# Patient Record
Sex: MALE | Race: WHITE | NOT HISPANIC OR LATINO | Employment: FULL TIME | URBAN - METROPOLITAN AREA
[De-identification: names, ages, dates, MRNs, and addresses within clinical notes are randomized per-mention and may not be internally consistent; named-entity substitution may affect disease eponyms.]

---

## 2018-12-07 ENCOUNTER — OFFICE VISIT (OUTPATIENT)
Dept: INTERNAL MEDICINE | Facility: CLINIC | Age: 33
End: 2018-12-07

## 2018-12-07 VITALS
SYSTOLIC BLOOD PRESSURE: 128 MMHG | RESPIRATION RATE: 18 BRPM | BODY MASS INDEX: 26.74 KG/M2 | TEMPERATURE: 97.4 F | WEIGHT: 201.8 LBS | OXYGEN SATURATION: 98 % | HEART RATE: 65 BPM | DIASTOLIC BLOOD PRESSURE: 78 MMHG | HEIGHT: 73 IN

## 2018-12-07 DIAGNOSIS — Z13.220 LIPID SCREENING: ICD-10-CM

## 2018-12-07 DIAGNOSIS — J40 BRONCHITIS: ICD-10-CM

## 2018-12-07 DIAGNOSIS — Z00.00 WELL ADULT EXAM: Primary | ICD-10-CM

## 2018-12-07 DIAGNOSIS — R05.9 COUGH: ICD-10-CM

## 2018-12-07 LAB
ALBUMIN SERPL-MCNC: 4.8 G/DL (ref 3.2–4.8)
ALBUMIN/GLOB SERPL: 2.1 G/DL (ref 1.5–2.5)
ALP SERPL-CCNC: 60 U/L (ref 25–100)
ALT SERPL W P-5'-P-CCNC: 23 U/L (ref 7–40)
ANION GAP SERPL CALCULATED.3IONS-SCNC: 5 MMOL/L (ref 3–11)
ARTICHOKE IGE QN: 149 MG/DL (ref 0–130)
AST SERPL-CCNC: 22 U/L (ref 0–33)
BILIRUB SERPL-MCNC: 0.6 MG/DL (ref 0.3–1.2)
BUN BLD-MCNC: 13 MG/DL (ref 9–23)
BUN/CREAT SERPL: 12.1 (ref 7–25)
CALCIUM SPEC-SCNC: 9.6 MG/DL (ref 8.7–10.4)
CHLORIDE SERPL-SCNC: 107 MMOL/L (ref 99–109)
CHOLEST SERPL-MCNC: 212 MG/DL (ref 0–200)
CO2 SERPL-SCNC: 30 MMOL/L (ref 20–31)
CREAT BLD-MCNC: 1.07 MG/DL (ref 0.6–1.3)
DEPRECATED RDW RBC AUTO: 40.2 FL (ref 37–54)
ERYTHROCYTE [DISTWIDTH] IN BLOOD BY AUTOMATED COUNT: 12.6 % (ref 11.3–14.5)
GFR SERPL CREATININE-BSD FRML MDRD: 80 ML/MIN/1.73
GLOBULIN UR ELPH-MCNC: 2.3 GM/DL
GLUCOSE BLD-MCNC: 91 MG/DL (ref 70–100)
HCT VFR BLD AUTO: 47.9 % (ref 38.9–50.9)
HDLC SERPL-MCNC: 43 MG/DL (ref 40–60)
HGB BLD-MCNC: 15.8 G/DL (ref 13.1–17.5)
MCH RBC QN AUTO: 29 PG (ref 27–31)
MCHC RBC AUTO-ENTMCNC: 33 G/DL (ref 32–36)
MCV RBC AUTO: 87.9 FL (ref 80–99)
PLATELET # BLD AUTO: 223 10*3/MM3 (ref 150–450)
PMV BLD AUTO: 9.6 FL (ref 6–12)
POTASSIUM BLD-SCNC: 4.8 MMOL/L (ref 3.5–5.5)
PROT SERPL-MCNC: 7.1 G/DL (ref 5.7–8.2)
RBC # BLD AUTO: 5.45 10*6/MM3 (ref 4.2–5.76)
SODIUM BLD-SCNC: 142 MMOL/L (ref 132–146)
T4 FREE SERPL-MCNC: 1.47 NG/DL (ref 0.89–1.76)
TRIGL SERPL-MCNC: 96 MG/DL (ref 0–150)
TSH SERPL DL<=0.05 MIU/L-ACNC: 1.64 MIU/ML (ref 0.35–5.35)
WBC NRBC COR # BLD: 4.53 10*3/MM3 (ref 3.5–10.8)

## 2018-12-07 PROCEDURE — 80061 LIPID PANEL: CPT | Performed by: INTERNAL MEDICINE

## 2018-12-07 PROCEDURE — 90686 IIV4 VACC NO PRSV 0.5 ML IM: CPT | Performed by: INTERNAL MEDICINE

## 2018-12-07 PROCEDURE — 84443 ASSAY THYROID STIM HORMONE: CPT | Performed by: INTERNAL MEDICINE

## 2018-12-07 PROCEDURE — 99385 PREV VISIT NEW AGE 18-39: CPT | Performed by: INTERNAL MEDICINE

## 2018-12-07 PROCEDURE — 36415 COLL VENOUS BLD VENIPUNCTURE: CPT | Performed by: INTERNAL MEDICINE

## 2018-12-07 PROCEDURE — 85027 COMPLETE CBC AUTOMATED: CPT | Performed by: INTERNAL MEDICINE

## 2018-12-07 PROCEDURE — 90471 IMMUNIZATION ADMIN: CPT | Performed by: INTERNAL MEDICINE

## 2018-12-07 PROCEDURE — 84439 ASSAY OF FREE THYROXINE: CPT | Performed by: INTERNAL MEDICINE

## 2018-12-07 PROCEDURE — 80053 COMPREHEN METABOLIC PANEL: CPT | Performed by: INTERNAL MEDICINE

## 2018-12-07 RX ORDER — ALBUTEROL SULFATE 90 UG/1
2 AEROSOL, METERED RESPIRATORY (INHALATION) EVERY 6 HOURS PRN
Qty: 18 G | Refills: 3 | Status: SHIPPED | OUTPATIENT
Start: 2018-12-07 | End: 2021-01-15 | Stop reason: SDUPTHER

## 2018-12-07 NOTE — PROGRESS NOTES
Subjective   Scott Araujo is a 33 y.o. male.     History of Present Illness     Physical Exam    1 cough-   Duration 6-8 weeks  Sx Patient says that cough is worse in the am.  Patient denies any dyspnea on exertion, no wheezing, no fever, chills, no shortness breath, no other systemic symptoms.  He also denies any allergic symptoms such as runny nose, sneezing, watery eyes at this time.    Past Medical History    Exercised induced asthma  Allergy shot as child    Diet regular    Exercise: Minimal to none    Social History no cigarette smoking, no IV drug use, + social EtOH          Review of Systems   All other systems reviewed and are negative.      Objective   Physical Exam   Constitutional: He is oriented to person, place, and time. He appears well-developed and well-nourished.   HENT:   Head: Normocephalic.   Right Ear: External ear normal.   Left Ear: External ear normal.   Nose: Nose normal.   Mouth/Throat: Oropharynx is clear and moist.   Eyes: Conjunctivae and EOM are normal. Pupils are equal, round, and reactive to light.   Neck: Normal range of motion. Neck supple.   Cardiovascular: Normal rate, regular rhythm and normal heart sounds.   Pulmonary/Chest: Effort normal and breath sounds normal.   Abdominal: Soft. Bowel sounds are normal. He exhibits no distension and no mass. There is no tenderness. There is no rebound and no guarding. No hernia.   Genitourinary: Penis normal.   Musculoskeletal: Normal range of motion.   Neurological: He is alert and oriented to person, place, and time.   Skin: Skin is warm. Capillary refill takes less than 2 seconds.   Psychiatric: He has a normal mood and affect. His behavior is normal. Judgment and thought content normal.   Nursing note and vitals reviewed.        Assessment/Plan   Scott was seen today for annual exam and cough.    Diagnoses and all orders for this visit:    Well adult exam  -     CBC (No Diff)  -     Comprehensive Metabolic Panel  -      TSH  -     T4, Free    Cough  -     albuterol (VENTOLIN HFA) 108 (90 Base) MCG/ACT inhaler; Inhale 2 puffs Every 6 (Six) Hours As Needed for Wheezing.    Lipid screening  -     Lipid Panel    Bronchitis  Symptoms may be suggestive of a overlay of bronchitis/exercise-induced asthma  -     albuterol (VENTOLIN HFA) 108 (90 Base) MCG/ACT inhaler; Inhale 2 puffs Every 6 (Six) Hours As Needed for Wheezing.    Recommend over-the-counter remedies to help with cough if persistent such as Delsym or Mucinex      Other orders  -     Fluarix/Fluzone/Afluria Quad>6 Months    Anticipatory guidance:  Recommend routine ophthalmology visit.  Monitor all consumption as tolerated.  Dental visit recommended.

## 2021-01-15 ENCOUNTER — OFFICE VISIT (OUTPATIENT)
Dept: INTERNAL MEDICINE | Facility: CLINIC | Age: 36
End: 2021-01-15

## 2021-01-15 VITALS
TEMPERATURE: 96.4 F | RESPIRATION RATE: 20 BRPM | HEART RATE: 58 BPM | OXYGEN SATURATION: 98 % | HEIGHT: 72 IN | DIASTOLIC BLOOD PRESSURE: 80 MMHG | WEIGHT: 219.13 LBS | BODY MASS INDEX: 29.68 KG/M2 | SYSTOLIC BLOOD PRESSURE: 136 MMHG

## 2021-01-15 DIAGNOSIS — R00.2 PALPITATIONS: ICD-10-CM

## 2021-01-15 DIAGNOSIS — J40 BRONCHITIS: ICD-10-CM

## 2021-01-15 DIAGNOSIS — R05.9 COUGH: ICD-10-CM

## 2021-01-15 DIAGNOSIS — Z23 NEED FOR INFLUENZA VACCINATION: Primary | ICD-10-CM

## 2021-01-15 LAB
ALBUMIN SERPL-MCNC: 5 G/DL (ref 3.5–5.2)
ALBUMIN/GLOB SERPL: 1.9 G/DL
ALP SERPL-CCNC: 57 U/L (ref 39–117)
ALT SERPL W P-5'-P-CCNC: 25 U/L (ref 1–41)
ANION GAP SERPL CALCULATED.3IONS-SCNC: 11.2 MMOL/L (ref 5–15)
AST SERPL-CCNC: 19 U/L (ref 1–40)
BILIRUB SERPL-MCNC: 0.7 MG/DL (ref 0–1.2)
BUN SERPL-MCNC: 20 MG/DL (ref 6–20)
BUN/CREAT SERPL: 19.6 (ref 7–25)
CALCIUM SPEC-SCNC: 9.7 MG/DL (ref 8.6–10.5)
CHLORIDE SERPL-SCNC: 102 MMOL/L (ref 98–107)
CHOLEST SERPL-MCNC: 224 MG/DL (ref 0–200)
CO2 SERPL-SCNC: 27.8 MMOL/L (ref 22–29)
CREAT SERPL-MCNC: 1.02 MG/DL (ref 0.76–1.27)
DEPRECATED RDW RBC AUTO: 40.5 FL (ref 37–54)
ERYTHROCYTE [DISTWIDTH] IN BLOOD BY AUTOMATED COUNT: 13.3 % (ref 12.3–15.4)
GFR SERPL CREATININE-BSD FRML MDRD: 84 ML/MIN/1.73
GLOBULIN UR ELPH-MCNC: 2.7 GM/DL
GLUCOSE SERPL-MCNC: 85 MG/DL (ref 65–99)
HCT VFR BLD AUTO: 47.8 % (ref 37.5–51)
HDLC SERPL-MCNC: 35 MG/DL (ref 40–60)
HGB BLD-MCNC: 16.1 G/DL (ref 13–17.7)
LDLC SERPL CALC-MCNC: 141 MG/DL (ref 0–100)
LDLC/HDLC SERPL: 3.9 {RATIO}
MCH RBC QN AUTO: 28.3 PG (ref 26.6–33)
MCHC RBC AUTO-ENTMCNC: 33.7 G/DL (ref 31.5–35.7)
MCV RBC AUTO: 84.2 FL (ref 79–97)
PLATELET # BLD AUTO: 269 10*3/MM3 (ref 140–450)
PMV BLD AUTO: 9.5 FL (ref 6–12)
POTASSIUM SERPL-SCNC: 4.9 MMOL/L (ref 3.5–5.2)
PROT SERPL-MCNC: 7.7 G/DL (ref 6–8.5)
RBC # BLD AUTO: 5.68 10*6/MM3 (ref 4.14–5.8)
SODIUM SERPL-SCNC: 141 MMOL/L (ref 136–145)
T4 FREE SERPL-MCNC: 1.3 NG/DL (ref 0.93–1.7)
TRIGL SERPL-MCNC: 262 MG/DL (ref 0–150)
TSH SERPL DL<=0.05 MIU/L-ACNC: 1.03 UIU/ML (ref 0.27–4.2)
VLDLC SERPL-MCNC: 48 MG/DL (ref 5–40)
WBC # BLD AUTO: 4.9 10*3/MM3 (ref 3.4–10.8)

## 2021-01-15 PROCEDURE — 93000 ELECTROCARDIOGRAM COMPLETE: CPT | Performed by: INTERNAL MEDICINE

## 2021-01-15 PROCEDURE — 90686 IIV4 VACC NO PRSV 0.5 ML IM: CPT | Performed by: INTERNAL MEDICINE

## 2021-01-15 PROCEDURE — 84439 ASSAY OF FREE THYROXINE: CPT | Performed by: INTERNAL MEDICINE

## 2021-01-15 PROCEDURE — 85027 COMPLETE CBC AUTOMATED: CPT | Performed by: INTERNAL MEDICINE

## 2021-01-15 PROCEDURE — 90471 IMMUNIZATION ADMIN: CPT | Performed by: INTERNAL MEDICINE

## 2021-01-15 PROCEDURE — 80053 COMPREHEN METABOLIC PANEL: CPT | Performed by: INTERNAL MEDICINE

## 2021-01-15 PROCEDURE — 80061 LIPID PANEL: CPT | Performed by: INTERNAL MEDICINE

## 2021-01-15 PROCEDURE — 99214 OFFICE O/P EST MOD 30 MIN: CPT | Performed by: INTERNAL MEDICINE

## 2021-01-15 PROCEDURE — 84443 ASSAY THYROID STIM HORMONE: CPT | Performed by: INTERNAL MEDICINE

## 2021-01-15 RX ORDER — ALBUTEROL SULFATE 90 UG/1
2 AEROSOL, METERED RESPIRATORY (INHALATION) EVERY 6 HOURS PRN
Qty: 18 G | Refills: 3 | Status: SHIPPED | OUTPATIENT
Start: 2021-01-15

## 2021-01-15 NOTE — PROGRESS NOTES
Subjective   Scott Araujo is a 35 y.o. male.     History of Present Illness     The following portions of the patient's history were reviewed and updated as appropriate: allergies, current medications, past family history, past medical history, past social history, past surgical history and problem list.          Complete Adult Physical    1 asthma-chronic and controlled.  Patient continues on appropriate inhaler therapy denies any coughing, wheezing, congestion, dyspnea on exertion, or any other recent hospitalizations or ER visits for his asthma.    2 palpitation- ( three episodes).  Patient says that this is a new recurrent onset of symptoms.  Says that he will develop these episodes intermittently the palpitations only last a few minutes after resolved.  He exercises on a regular basis and does not have any issues    Diet: Regular        Exercise-occasional minimal to none        Social History: No EtOH consumption, no IV drug use, no marijuana, no illicit drugs.        Preventative Screenings  Up-to-date        Immunizations: Up-to-date          Review of Systems   All other systems reviewed and are negative.      Objective   Physical Exam  Vitals signs and nursing note reviewed.   Constitutional:       Appearance: Normal appearance. He is normal weight.   HENT:      Head: Normocephalic and atraumatic.      Right Ear: Tympanic membrane, ear canal and external ear normal.      Left Ear: Tympanic membrane, ear canal and external ear normal.      Nose: Nose normal.      Mouth/Throat:      Mouth: Mucous membranes are moist.   Cardiovascular:      Pulses: Normal pulses.      Heart sounds: Normal heart sounds.   Pulmonary:      Effort: Pulmonary effort is normal.      Breath sounds: Normal breath sounds.   Abdominal:      General: Bowel sounds are normal.      Palpations: Abdomen is soft.   Musculoskeletal: Normal range of motion.   Skin:     General: Skin is warm.      Capillary Refill: Capillary refill  takes less than 2 seconds.   Neurological:      General: No focal deficit present.      Mental Status: He is alert.           Assessment/Plan   Diagnoses and all orders for this visit:    1. Need for influenza vaccination (Primary)  -     Fluarix/Fluzone/Afluria Quad>6 Months    2. Cough  -     albuterol sulfate HFA (Ventolin HFA) 108 (90 Base) MCG/ACT inhaler; Inhale 2 puffs Every 6 (Six) Hours As Needed for Wheezing.  Dispense: 18 g; Refill: 3    3. Bronchitis  -     albuterol sulfate HFA (Ventolin HFA) 108 (90 Base) MCG/ACT inhaler; Inhale 2 puffs Every 6 (Six) Hours As Needed for Wheezing.  Dispense: 18 g; Refill: 3    4. Palpitations (new issue)  -     ECG 12 Lead  -     Treadmill Stress Test; Future  -     CBC (No Diff)  -     Comprehensive Metabolic Panel  -     T4, Free  -     TSH  -     Lipid Panel          ECG 12 Lead    Date/Time: 1/15/2021 11:56 AM  Performed by: Nilo Braswell MD  Authorized by: Nilo Braswell MD   Comparison: not compared with previous ECG   Rhythm: sinus rhythm  Rate: normal  Conduction: conduction normal  ST Segments: ST segments normal  QRS axis: normal    Clinical impression: normal ECG

## 2021-02-19 ENCOUNTER — HOSPITAL ENCOUNTER (OUTPATIENT)
Dept: CARDIOLOGY | Facility: HOSPITAL | Age: 36
End: 2021-02-19

## 2021-03-09 ENCOUNTER — TELEPHONE (OUTPATIENT)
Dept: INTERNAL MEDICINE | Facility: CLINIC | Age: 36
End: 2021-03-09

## 2021-03-09 NOTE — TELEPHONE ENCOUNTER
Patient's wife is calling stating that patient has been admitted into the hospital.  Patient's wife wanted to let provider know

## 2021-03-12 ENCOUNTER — TELEMEDICINE (OUTPATIENT)
Dept: INTERNAL MEDICINE | Facility: CLINIC | Age: 36
End: 2021-03-12

## 2021-03-12 DIAGNOSIS — R94.6 ABNORMAL THYROID FUNCTION TEST: ICD-10-CM

## 2021-03-12 DIAGNOSIS — I10 ESSENTIAL HYPERTENSION: ICD-10-CM

## 2021-03-12 DIAGNOSIS — R07.9 CHEST PAIN, UNSPECIFIED TYPE: ICD-10-CM

## 2021-03-12 DIAGNOSIS — E78.01 FAMILIAL HYPERCHOLESTEROLEMIA: Primary | ICD-10-CM

## 2021-03-12 PROCEDURE — 99214 OFFICE O/P EST MOD 30 MIN: CPT | Performed by: INTERNAL MEDICINE

## 2021-03-12 RX ORDER — SIMVASTATIN 20 MG
20 TABLET ORAL NIGHTLY
Qty: 90 TABLET | Refills: 3 | Status: SHIPPED | OUTPATIENT
Start: 2021-03-12 | End: 2022-01-07 | Stop reason: SDUPTHER

## 2021-03-12 NOTE — PROGRESS NOTES
"Subjective   Scott Araujo is a 35 y.o. male.     History of Present Illness     The following portions of the patient's history were reviewed and updated as appropriate: allergies, current medications, past family history, past medical history, past social history, past surgical history and problem list.    Patient has consented for [facility] Follow up  1 chest pain/pressure-patient was out of town when he started to \"feel different \"and his blood pressure was also elevated.  Patient went to a outside emergency room in [place] and received a full cardiac work-up.  Patient had blood work, EKG, 2D echo, nuclear stress test and all labs and diagnostics were normal.  Only abnormality was that patient had a slight elevated TSH and normal reported range free T4.    Family history:  + Heavy coronary artery disease  Social history: No EtOH consumption, no IV drug use, no marijuana, illicit drugs.      Review of Systems   All other systems reviewed and are negative.      Objective   Physical Exam  Vitals and nursing note reviewed.   Constitutional:       Appearance: Normal appearance. He is normal weight.   HENT:      Head: Normocephalic and atraumatic.      Right Ear: Tympanic membrane, ear canal and external ear normal.      Left Ear: Tympanic membrane, ear canal and external ear normal.      Nose: Nose normal.      Mouth/Throat:      Mouth: Mucous membranes are moist.   Eyes:      Extraocular Movements: Extraocular movements intact.      Conjunctiva/sclera: Conjunctivae normal.      Pupils: Pupils are equal, round, and reactive to light.   Cardiovascular:      Rate and Rhythm: Normal rate.      Pulses: Normal pulses.      Heart sounds: Normal heart sounds.   Pulmonary:      Effort: Pulmonary effort is normal.      Breath sounds: Normal breath sounds.   Abdominal:      General: Bowel sounds are normal.      Palpations: Abdomen is soft.   Skin:     General: Skin is warm.      Capillary Refill: " Capillary refill takes less than 2 seconds.   Neurological:      General: No focal deficit present.      Mental Status: He is alert.   Psychiatric:         Mood and Affect: Mood normal.         Behavior: Behavior normal.         Thought Content: Thought content normal.         Judgment: Judgment normal.           Assessment/Plan   Diagnoses and all orders for this visit:    Spent 30 minutes via video [facility] face-to-face with patient.    1. Familial hypercholesterolemia (Primary)  -     simvastatin (Zocor) 20 MG tablet; Take 1 tablet by mouth Every Night.  Dispense: 90 tablet; Refill: 3    2. Chest pain, unspecified type  -     Ambulatory Referral to Cardiology    3. Essential hypertension-recommend continue on the amlodipine 10 mg by mouth once a day.  Continue to monitor blood pressure readings very closely and record these for me to review.    4. Abnormal thyroid function test  -     T4, Free; Future  -     TSH; Future

## 2021-03-26 ENCOUNTER — LAB (OUTPATIENT)
Dept: INTERNAL MEDICINE | Facility: CLINIC | Age: 36
End: 2021-03-26

## 2021-03-26 DIAGNOSIS — R94.6 ABNORMAL THYROID FUNCTION TEST: ICD-10-CM

## 2021-03-26 LAB
T4 FREE SERPL-MCNC: 1.35 NG/DL (ref 0.93–1.7)
TSH SERPL DL<=0.05 MIU/L-ACNC: 1.91 UIU/ML (ref 0.27–4.2)

## 2021-03-26 PROCEDURE — 36415 COLL VENOUS BLD VENIPUNCTURE: CPT | Performed by: INTERNAL MEDICINE

## 2021-03-26 PROCEDURE — 84443 ASSAY THYROID STIM HORMONE: CPT | Performed by: INTERNAL MEDICINE

## 2021-03-26 PROCEDURE — 84439 ASSAY OF FREE THYROXINE: CPT | Performed by: INTERNAL MEDICINE

## 2021-04-01 RX ORDER — PANTOPRAZOLE SODIUM 40 MG/1
40 TABLET, DELAYED RELEASE ORAL DAILY
COMMUNITY
Start: 2021-03-09 | End: 2021-04-12 | Stop reason: SDUPTHER

## 2021-04-01 RX ORDER — AMLODIPINE BESYLATE 10 MG/1
10 TABLET ORAL DAILY
COMMUNITY
Start: 2021-03-09 | End: 2021-04-01 | Stop reason: SDUPTHER

## 2021-04-01 RX ORDER — AMLODIPINE BESYLATE 10 MG/1
10 TABLET ORAL DAILY
Qty: 30 TABLET | Refills: 2 | Status: SHIPPED | OUTPATIENT
Start: 2021-04-01 | End: 2021-07-07

## 2021-04-11 DIAGNOSIS — E78.01 FAMILIAL HYPERCHOLESTEROLEMIA: ICD-10-CM

## 2021-04-11 RX ORDER — SIMVASTATIN 20 MG
20 TABLET ORAL NIGHTLY
Qty: 90 TABLET | Refills: 3 | Status: CANCELLED | OUTPATIENT
Start: 2021-04-11

## 2021-04-12 NOTE — TELEPHONE ENCOUNTER
----- Message from Scott Araujo sent at 4/11/2021  8:18 PM EDT -----  Regarding: Prescription Question  Contact: [phone]  I’m out of my pantoprazole 40 mg. Can you please get a refill called in to [facility]? (I couldn’t request through the prescription area for some reason).    I also have only 3 days worth of my statin if you can get that called in too please.     My BP is still too elevated with my current meds. I’ve been keeping a chart. Systolic in the 140’s during the day. Do we need to modify?    Thank you!    Herve Araujo

## 2021-04-12 NOTE — TELEPHONE ENCOUNTER
Patient has refills on his Cholesterol medication, no need for a refill. Please advise on his BP and I have pended the Protonix for approval.

## 2021-04-13 RX ORDER — PANTOPRAZOLE SODIUM 40 MG/1
40 TABLET, DELAYED RELEASE ORAL DAILY
Qty: 90 TABLET | Refills: 2 | Status: SHIPPED | OUTPATIENT
Start: 2021-04-13 | End: 2022-01-07 | Stop reason: SDUPTHER

## 2021-04-16 ENCOUNTER — TELEPHONE (OUTPATIENT)
Dept: INTERNAL MEDICINE | Facility: CLINIC | Age: 36
End: 2021-04-16

## 2021-04-16 ENCOUNTER — TELEMEDICINE (OUTPATIENT)
Dept: INTERNAL MEDICINE | Facility: CLINIC | Age: 36
End: 2021-04-16

## 2021-04-16 DIAGNOSIS — I10 ESSENTIAL HYPERTENSION: Primary | ICD-10-CM

## 2021-04-16 PROCEDURE — 99214 OFFICE O/P EST MOD 30 MIN: CPT | Performed by: INTERNAL MEDICINE

## 2021-04-16 NOTE — TELEPHONE ENCOUNTER
Patient states his B/P was elevated yesterday 170/90's for hours with dizziness. He states it's normal this morning but by midday it's elevated again. He states he has been taking his medication. He states he has an appt with Dr Putnam at the end of May. He states should his medication be altered. He can be reached at [phone].

## 2021-04-16 NOTE — PROGRESS NOTES
Subjective   Scott Araujo is a 36 y.o. male.     History of Present Illness     The following portions of the patient's history were reviewed and updated as appropriate: allergies, current medications, past family history, past medical history, past social history, past surgical history and problem list.    Patient has consented for video MyChart    1 Hypertension-patient says that he had an elevated blood pressure reading recently yesterday of a blood pressure of 170/100 and says that he felt dizzy, lightheaded, and minimal palpitations, no shortness of breath, no chest pain, no nausea, no vomiting no diarrhea, no fever, no chills, other systemic symptoms.    Social history: +24 ounces of caffeine within the 6-hour period prior to this particular incident and normally he only drinks about 12 ounces.  Of caffeine      Review of Systems   All other systems reviewed and are negative.      Objective   Physical Exam  Vitals and nursing note reviewed.   Constitutional:       Appearance: Normal appearance.   HENT:      Head: Normocephalic and atraumatic.      Right Ear: Tympanic membrane normal.      Left Ear: Tympanic membrane normal.      Nose: Nose normal.      Mouth/Throat:      Mouth: Mucous membranes are moist.   Eyes:      Extraocular Movements: Extraocular movements intact.      Pupils: Pupils are equal, round, and reactive to light.   Musculoskeletal:         General: Normal range of motion.   Skin:     General: Skin is warm.      Capillary Refill: Capillary refill takes less than 2 seconds.   Neurological:      General: No focal deficit present.      Mental Status: He is alert.   Psychiatric:         Mood and Affect: Mood normal.         Behavior: Behavior normal.         Thought Content: Thought content normal.         Judgment: Judgment normal.           Assessment/Plan   Diagnoses and all orders for this visit:    1. Essential hypertension (Primary)    Spent 30 minutes face-to-face via video  Thomas visit with patient.    After review of history, physical, strongly recommend emphasis on lifestyle modification as patient does tend to consume moderate amount of caffeine.  Patient had double dose of caffeine hours prior to this elevated blood pressure reading and I think this had contributed to it.    Emphasis on decreasing or eliminating caffeine in diet as well as consider exercise will help maintain appropriate blood pressure.    Patient to record blood pressure readings and report to me in approximately 1 month

## 2021-07-07 RX ORDER — AMLODIPINE BESYLATE 10 MG/1
TABLET ORAL
Qty: 90 TABLET | Refills: 3 | Status: SHIPPED | OUTPATIENT
Start: 2021-07-07 | End: 2021-07-07 | Stop reason: SDUPTHER

## 2021-07-07 RX ORDER — AMLODIPINE BESYLATE 10 MG/1
10 TABLET ORAL DAILY
Qty: 90 TABLET | Refills: 3 | Status: SHIPPED | OUTPATIENT
Start: 2021-07-07 | End: 2022-01-07 | Stop reason: SDUPTHER

## 2022-01-07 DIAGNOSIS — E78.01 FAMILIAL HYPERCHOLESTEROLEMIA: ICD-10-CM

## 2022-01-07 RX ORDER — AMLODIPINE BESYLATE 10 MG/1
10 TABLET ORAL DAILY
Qty: 90 TABLET | Refills: 3 | Status: SHIPPED | OUTPATIENT
Start: 2022-01-07

## 2022-01-07 RX ORDER — PANTOPRAZOLE SODIUM 40 MG/1
40 TABLET, DELAYED RELEASE ORAL DAILY
Qty: 90 TABLET | Refills: 2 | Status: SHIPPED | OUTPATIENT
Start: 2022-01-07 | End: 2022-10-03

## 2022-01-07 RX ORDER — SIMVASTATIN 20 MG
20 TABLET ORAL NIGHTLY
Qty: 90 TABLET | Refills: 3 | Status: SHIPPED | OUTPATIENT
Start: 2022-01-07 | End: 2023-01-03

## 2022-01-11 ENCOUNTER — TELEPHONE (OUTPATIENT)
Dept: INTERNAL MEDICINE | Facility: CLINIC | Age: 37
End: 2022-01-11

## 2022-01-11 NOTE — TELEPHONE ENCOUNTER
Caller: Scott Araujo    Relationship: Self    Best call back number: [phone]    What medication are you requesting: PAXOOVID    What are your current symptoms: POSITIVE FOR COVID; COUGH, BODY ACHES, FATIGUE;  FAMILY MEMBER POSITIVE    How long have you been experiencing symptoms: 3 DAYS    Have you had these symptoms before:    [] Yes  [] No    Have you been treated for these symptoms before:   [] Yes  [] No    If a prescription is needed, what is your preferred pharmacy and phone number:      [facility] [address] [phone] [facility] [phone]         Additional notes:

## 2022-01-12 NOTE — TELEPHONE ENCOUNTER
S/W Jaida and advised her that Paxoovid is not available in [place] at this time.      Also, informed her that the only medication needed is through supportive care i.e. pushing for fluids for hydration, decongestant like Mucinex and antihistamine like Claritin or Zyrtec for upper respiratory symptoms, cough suppressant (Delsym) if needed and to alternate Tylenol and/or Motrin for fever reduction and muscle aches    Patient should continue to monitor respiratory symptoms during quarantine and if symptoms become worse with breathing, coughing, feeling short of breath, fever, chills, dehydrated patient should be seen in the emergency room i.e. if symptoms are getting worse.       Encourage patient to purchase a pulse ox to monitor oxygen levels.  If O2 is 92% or less he should go to the ED.

## 2022-01-12 NOTE — TELEPHONE ENCOUNTER
I checked with the pharmacy and this medication is only available currently in 3 states.  [place] is not one of them.  This medication is fairly new especially under the emergency authorization contacts for COVID-19 and is going to be limited distribution currently.

## 2022-06-10 ENCOUNTER — OFFICE VISIT (OUTPATIENT)
Dept: INTERNAL MEDICINE | Facility: CLINIC | Age: 37
End: 2022-06-10

## 2022-06-10 VITALS
WEIGHT: 218.25 LBS | TEMPERATURE: 98.7 F | OXYGEN SATURATION: 98 % | BODY MASS INDEX: 29.6 KG/M2 | SYSTOLIC BLOOD PRESSURE: 118 MMHG | RESPIRATION RATE: 20 BRPM | DIASTOLIC BLOOD PRESSURE: 80 MMHG | HEART RATE: 66 BPM

## 2022-06-10 DIAGNOSIS — M54.2 NECK PAIN: ICD-10-CM

## 2022-06-10 DIAGNOSIS — Z13.220 LIPID SCREENING: ICD-10-CM

## 2022-06-10 DIAGNOSIS — G89.29 CHRONIC BILATERAL LOW BACK PAIN WITHOUT SCIATICA: Primary | ICD-10-CM

## 2022-06-10 DIAGNOSIS — I10 ESSENTIAL HYPERTENSION: ICD-10-CM

## 2022-06-10 DIAGNOSIS — M54.50 CHRONIC BILATERAL LOW BACK PAIN WITHOUT SCIATICA: Primary | ICD-10-CM

## 2022-06-10 DIAGNOSIS — R10.32 LEFT INGUINAL PAIN: ICD-10-CM

## 2022-06-10 DIAGNOSIS — Z00.00 HEALTHCARE MAINTENANCE: ICD-10-CM

## 2022-06-10 LAB
ALBUMIN SERPL-MCNC: 4.8 G/DL (ref 3.5–5.2)
ALBUMIN/GLOB SERPL: 1.7 G/DL
ALP SERPL-CCNC: 65 U/L (ref 39–117)
ALT SERPL-CCNC: 29 U/L (ref 1–41)
AST SERPL-CCNC: 22 U/L (ref 1–40)
BILIRUB BLD-MCNC: NEGATIVE MG/DL
BILIRUB SERPL-MCNC: 0.4 MG/DL (ref 0–1.2)
BUN SERPL-MCNC: 12 MG/DL (ref 6–20)
BUN/CREAT SERPL: 12.9 (ref 7–25)
CALCIUM SERPL-MCNC: 9.6 MG/DL (ref 8.6–10.5)
CHLORIDE SERPL-SCNC: 103 MMOL/L (ref 98–107)
CHOLEST SERPL-MCNC: 180 MG/DL (ref 0–200)
CLARITY, POC: CLEAR
CO2 SERPL-SCNC: 26.2 MMOL/L (ref 22–29)
COLOR UR: YELLOW
CREAT SERPL-MCNC: 0.93 MG/DL (ref 0.76–1.27)
EGFRCR SERPLBLD CKD-EPI 2021: 109.1 ML/MIN/1.73
ERYTHROCYTE [DISTWIDTH] IN BLOOD BY AUTOMATED COUNT: 13.1 % (ref 12.3–15.4)
EXPIRATION DATE: ABNORMAL
GLOBULIN SER CALC-MCNC: 2.9 GM/DL
GLUCOSE SERPL-MCNC: 83 MG/DL (ref 65–99)
GLUCOSE UR STRIP-MCNC: NEGATIVE MG/DL
HCT VFR BLD AUTO: 44.9 % (ref 37.5–51)
HDLC SERPL-MCNC: 36 MG/DL (ref 40–60)
HGB BLD-MCNC: 15.3 G/DL (ref 13–17.7)
KETONES UR QL: ABNORMAL
LDLC SERPL CALC-MCNC: 89 MG/DL (ref 0–100)
LEUKOCYTE EST, POC: NEGATIVE
Lab: ABNORMAL
MCH RBC QN AUTO: 28.8 PG (ref 26.6–33)
MCHC RBC AUTO-ENTMCNC: 34.1 G/DL (ref 31.5–35.7)
MCV RBC AUTO: 84.4 FL (ref 79–97)
NITRITE UR-MCNC: NEGATIVE MG/ML
PH UR: 7 [PH] (ref 5–8)
PLATELET # BLD AUTO: 269 10*3/MM3 (ref 140–450)
POTASSIUM SERPL-SCNC: 4.3 MMOL/L (ref 3.5–5.2)
PROT SERPL-MCNC: 7.7 G/DL (ref 6–8.5)
PROT UR STRIP-MCNC: NEGATIVE MG/DL
RBC # BLD AUTO: 5.32 10*6/MM3 (ref 4.14–5.8)
RBC # UR STRIP: NEGATIVE /UL
SODIUM SERPL-SCNC: 142 MMOL/L (ref 136–145)
SP GR UR: 1 (ref 1–1.03)
T4 FREE SERPL-MCNC: 1.37 NG/DL (ref 0.93–1.7)
TRIGL SERPL-MCNC: 333 MG/DL (ref 0–150)
TSH SERPL DL<=0.005 MIU/L-ACNC: 1.55 UIU/ML (ref 0.27–4.2)
UROBILINOGEN UR QL: NORMAL
VLDLC SERPL CALC-MCNC: 55 MG/DL (ref 5–40)
WBC # BLD AUTO: 5.41 10*3/MM3 (ref 3.4–10.8)

## 2022-06-10 PROCEDURE — 81003 URINALYSIS AUTO W/O SCOPE: CPT | Performed by: INTERNAL MEDICINE

## 2022-06-10 PROCEDURE — 99214 OFFICE O/P EST MOD 30 MIN: CPT | Performed by: INTERNAL MEDICINE

## 2022-06-10 RX ORDER — LOSARTAN POTASSIUM 100 MG/1
100 TABLET ORAL DAILY
COMMUNITY
Start: 2022-06-07

## 2022-06-10 NOTE — PROGRESS NOTES
"Chief Complaint  Back Pain and Groin Pain    Subjective    Scott Araujo is a 37 y.o. male.     Scott Araujo presents to [facility] for back and inguinal pain.      History of Present Illness  1. 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 He states that he gets massages every 3 weeks and it keeps everything at bay.     2. Inguinal pain - He reports that this pain is different to his chronic pain. He reports that 2 weeks ago, he was golfing and he took a swing and then reached up front and it felt like he \"popped\" his testicle and he had a tugging and pulling sensation, however he continued playing with the pain. He reports that 1 week later, on 05/31/2022 and 06/01/2022, he was unable to sit, bend over and put socks or underwear on. He reports that the pain was radiating and he was having a very difficult time trying to locate the pain. On 06/02/2022 and 06/03/2022 he could not sit and he was asymptomatic on 06/04/2022. 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 He reports that the pain was terrible on 06/05/2022 and 06/06/2022 and he was fine from 06/07/2022 through 06/09/2022. The patient reports that the pain is intermittent, there is no bulge and he is unable to palpate anything. He reports that the discrepancy in pain level is really what is throwing him off. He states that the pain is sharp, piercing and debilitating. He reports that the intermittent asymptomatic days are confusing him and the last couple of days it has been painful but he has been able to be mobile.    The following portions of the patient's history were reviewed and updated as appropriate: allergies, current medications, past family history, past medical history, past social history, past surgical history and problem list.    Review of " Systems   A review of systems was performed, and the pertinent positives are noted in the HPI.      Objective   Vital Signs:   /80 (BP Location: Right arm, Patient Position: Sitting, Cuff Size: Adult)   Pulse 66   Temp 98.7 °F (37.1 °C) (Temporal)   Resp 20   Wt 99 kg (218 lb 4 oz)   SpO2 98%   BMI 29.60 kg/m²     Body mass index is 29.6 kg/m².    Physical Exam   Patient is alert x3, in no distress.  HEENT: Head was normocephalic, atraumatic. Moist membranes.  Chest is clear to auscultation, no rhonchi, wheeze.  Cardiac exam: S1 and S2, no murmurs, rubs, or gallops.  GI: Positive bowel sounds, soft, no mass, no tenderness.   exam: Specifically in the left inguinal area, I did not appreciate any bulging or any pain to palpation in this particular area. No lesions on the penis. Scrotum intact. No swelling upon Valsalva. Did not appreciate any full hernia. Palpation to the lower suprapubic area did not reveal any particular areas of concern. Also palpated on the left costovertebral area in the posterior renal area and there was no tenderness or involvement in that area.     Assessment and Plan  Diagnoses and all orders for this visit:    1. Inguinal pain and discomfort suggestive of a possible mild inguinal hernia versus left inguinal groin strain        -  I did discuss with patient different signs to look out for and continue observation in regards to aggravating factors, relieving factors, location, activity, and also things which he can do to help relieve, to keep observation to allow us to have a better history and assessment of what may be going on.        -  I will also go ahead and refer him to general surgery to evaluate this area to see if there are any other concerns of a pending hernia or other issues.    2. Hypertension        -  Blood pressure looks controlled today. Patient continues on the amlodipine 10 mg by mouth once a day and Cozaar 100 mg by mouth once a day. Otherwise, everything  looks good here.    I will see him back as needed or in 6 months on routine follow-up.    Follow Up   Return Needs to schedule for annual physical.  Patient was given instructions and counseling regarding his condition or for health maintenance advice. Please see specific information pulled into the AVS if appropriate.      Transcribed from ambient dictation for Nilo Braswell MD by Sandy Duong.  06/10/22   10:53 EDT    Patient verbalized consent to the visit recording.  I have personally performed the services described in this document as transcribed by the above individual, and it is both accurate and complete.  Nilo Braswell MD  6/25/2022  17:43 EDT

## 2022-10-03 RX ORDER — PANTOPRAZOLE SODIUM 40 MG/1
TABLET, DELAYED RELEASE ORAL
Qty: 90 TABLET | Refills: 2 | Status: SHIPPED | OUTPATIENT
Start: 2022-10-03

## 2022-11-30 ENCOUNTER — PATIENT MESSAGE (OUTPATIENT)
Dept: INTERNAL MEDICINE | Facility: CLINIC | Age: 37
End: 2022-11-30

## 2022-12-01 DIAGNOSIS — Z91.018 FOOD ALLERGY: Primary | ICD-10-CM

## 2022-12-01 RX ORDER — EPINEPHRINE 0.3 MG/.3ML
0.3 INJECTION SUBCUTANEOUS ONCE
Qty: 1 EACH | Refills: 1 | Status: SHIPPED | OUTPATIENT
Start: 2022-12-01 | End: 2022-12-01

## 2022-12-05 ENCOUNTER — TELEPHONE (OUTPATIENT)
Dept: INTERNAL MEDICINE | Facility: CLINIC | Age: 37
End: 2022-12-05

## 2022-12-05 DIAGNOSIS — Z91.018 FOOD ALLERGY: Primary | ICD-10-CM

## 2022-12-05 NOTE — TELEPHONE ENCOUNTER
Caller: Scott Araujo    Relationship: Self    Best call back number: [phone]    What orders are you requesting (i.e. lab or imaging): LAB ALFEGAL SYNDROME     In what timeframe would the patient need to come in: ASAP    Where will you receive your lab/imaging services: [facility] [place]    Additional notes: PATIENT WILL UPDATE WITH FAX NUMBER TO SEND ORDER, PLEASE REVIEW PATIENT MESSAGES FOR FAX NUMBER

## 2022-12-05 NOTE — TELEPHONE ENCOUNTER
Caller: Scott Araujo    Relationship: Self    Best call back number: [phone]       What orders are you requesting (i.e. lab or imaging): LAB ORDERS FOR AN alpha-gal blood test       In what timeframe would the patient need to come in: ASAP    Where will you receive your lab/imaging services: [facility] (address is [address] [place] [address]).   PHONE NUMBER [phone]  UNSURE OF FAX NUMBER    Additional notes: PLEASE AND ADVISE WHEN LABS ORDERS HAVE BEEN SENT TO [facility]

## 2022-12-08 ENCOUNTER — LAB (OUTPATIENT)
Dept: LAB | Facility: HOSPITAL | Age: 37
End: 2022-12-08

## 2022-12-08 DIAGNOSIS — Z91.018 FOOD ALLERGY: ICD-10-CM

## 2022-12-08 NOTE — TELEPHONE ENCOUNTER
From: Scott Araujo  To: Nilo Braswell MD  Sent: 11/30/2022 12:05 PM EST  Subject: Alpha-gal test and epi pen     Hi Dr. Braswell     Can you send in an order for an alpha-gal blood test for me? I’d like to go do it in between patients while I’m up in [place] at [facility] (address is [facility] [address] [place] [address]). I had several bites all over my body during hunting season that I assumed were chiggers but very may well have been ticks. Since then I have broken out in hives 4 times. The common denominator has been I have eaten meat the night before at dinner. All times the hives have come the following morning. It has not obstructed the airway but has led to difficulty swallowing and hives all over. I have taken benadryl to control but they last about 24 hours. Also I was wondering if you can call in a prescription for an epi pen in case I need it?      Thank you    Herve Araujo

## 2022-12-13 LAB
ALPHA-GAL IGE QN: 74.2 KU/L
BEEF IGE QN: 9.73 KU/L
CONV CLASS DESCRIPTION: ABNORMAL
IGE SERPL-ACNC: 799 IU/ML (ref 6–495)
LAMB IGE QN: 2.3 KU/L
PORK IGE QN: 2.53 KU/L

## 2023-01-02 DIAGNOSIS — E78.01 FAMILIAL HYPERCHOLESTEROLEMIA: ICD-10-CM

## 2023-01-03 RX ORDER — SIMVASTATIN 20 MG
TABLET ORAL
Qty: 90 TABLET | Refills: 3 | Status: SHIPPED | OUTPATIENT
Start: 2023-01-03

## 2023-04-10 RX ORDER — AMLODIPINE BESYLATE 10 MG/1
TABLET ORAL
Qty: 90 TABLET | Refills: 3 | Status: SHIPPED | OUTPATIENT
Start: 2023-04-10

## 2024-01-21 RX ORDER — OSELTAMIVIR PHOSPHATE 75 MG/1
75 CAPSULE ORAL DAILY
Qty: 10 CAPSULE | Refills: 0 | Status: SHIPPED | OUTPATIENT
Start: 2024-01-21

## 2024-01-25 DIAGNOSIS — Z20.828 EXPOSURE TO INFLUENZA: Primary | ICD-10-CM

## 2024-04-05 DIAGNOSIS — I10 PRIMARY HYPERTENSION: Primary | ICD-10-CM

## 2024-04-05 NOTE — TELEPHONE ENCOUNTER
LOV 06/10/2022  NOV     Tried to reach patient no answer left voicemail to return call    RELAY:  We recently received your message to refill your medication(s).  Our records indicate that you did not schedule a follow up appointment with your provider at your last visit on 06/10/2022. The medication that you are on requires a follow up appointment for additional refills. Patient was to schedule on or around 04/1/2024 for Annual Physical    If he is unable to keep his appointment we will not be able to provider further refills.  Once appointment has been scheduled please update message with date and time so we can process the request.  We will forward the message to the provider to review the refill request.

## 2024-04-08 NOTE — TELEPHONE ENCOUNTER
2nd attempt     LOV 06/10/2022  NOV      Tried to reach patient no answer left voicemail to return call     RELAY:  We recently received your message to refill your medication(s).  Our records indicate that you did not schedule a follow up appointment with your provider at your last visit on 06/10/2022. The medication that you are on requires a follow up appointment for additional refills. Patient was to schedule on or around 04/1/2024 for Annual Physical     If he is unable to keep his appointment we will not be able to provider further refills.  Once appointment has been scheduled please update message with date and time so we can process the request.  We will forward the message to the provider to review the refill request.

## 2024-04-10 RX ORDER — AMLODIPINE BESYLATE 10 MG/1
TABLET ORAL
Qty: 90 TABLET | Refills: 0 | Status: SHIPPED | OUTPATIENT
Start: 2024-04-10 | End: 2024-04-11

## 2024-04-10 NOTE — TELEPHONE ENCOUNTER
Please tell patient that his blood pressure medication has been called in but he will need to make a follow-up appointment for further refills in the future

## 2024-04-10 NOTE — TELEPHONE ENCOUNTER
3rd attempt      LOV 06/10/2022  NOV      Tried to reach patient no answer left voicemail to return call     RELAY:  We recently received your message to refill your medication(s).  Our records indicate that you did not schedule a follow up appointment with your provider at your last visit on 06/10/2022. The medication that you are on requires a follow up appointment for additional refills. Patient was to schedule on or around 04/1/2024 for Annual Physical     If he is unable to keep his appointment we will not be able to provider further refills.  Once appointment has been scheduled please update message with date and time so we can process the request.  We will forward the message to the provider to review the refill request.

## 2024-04-11 DIAGNOSIS — I10 PRIMARY HYPERTENSION: ICD-10-CM

## 2024-04-11 RX ORDER — AMLODIPINE BESYLATE 10 MG/1
10 TABLET ORAL DAILY
Qty: 90 TABLET | Refills: 0 | Status: SHIPPED | OUTPATIENT
Start: 2024-04-11

## 2024-07-07 DIAGNOSIS — I10 PRIMARY HYPERTENSION: ICD-10-CM

## 2024-07-08 RX ORDER — AMLODIPINE BESYLATE 10 MG/1
10 TABLET ORAL DAILY
Qty: 30 TABLET | Refills: 0 | Status: SHIPPED | OUTPATIENT
Start: 2024-07-08

## 2024-08-07 DIAGNOSIS — I10 PRIMARY HYPERTENSION: ICD-10-CM

## 2024-08-08 RX ORDER — AMLODIPINE BESYLATE 10 MG/1
10 TABLET ORAL DAILY
Qty: 30 TABLET | Refills: 1 | Status: SHIPPED | OUTPATIENT
Start: 2024-08-08

## 2024-08-08 NOTE — TELEPHONE ENCOUNTER
Tried to reach patient no answer left voicemail to return call    RELAY:  Courtesy refill for blood pressure medication has been called in but patient will need to make a follow-up and labs to be done for further refills

## 2024-08-08 NOTE — TELEPHONE ENCOUNTER
Courtesy refill for blood pressure medication has been called in but patient will need to make a follow-up and labs to be done for further refills

## 2024-08-09 NOTE — TELEPHONE ENCOUNTER
2nd attempt     Tried to reach patient no answer left voicemail to return call     RELAY:  [facility] refill for blood pressure medication has been called in but patient will need to make a follow-up and labs to be done for further refills

## 2024-08-12 NOTE — TELEPHONE ENCOUNTER
3rd attempt      Tried to reach patient no answer left voicemail to return call     RELAY:  Courtesy refill for blood pressure medication has been called in but patient will need to make a follow-up and labs to be done for further refills

## 2024-12-13 ENCOUNTER — PRE-ADMISSION TESTING (OUTPATIENT)
Dept: PREADMISSION TESTING | Facility: HOSPITAL | Age: 39
End: 2024-12-13
Payer: COMMERCIAL

## 2024-12-13 VITALS — WEIGHT: 227.51 LBS | HEIGHT: 72 IN | BODY MASS INDEX: 30.82 KG/M2

## 2024-12-13 LAB
ANION GAP SERPL CALCULATED.3IONS-SCNC: 10 MMOL/L (ref 5–15)
BUN SERPL-MCNC: 16 MG/DL (ref 6–20)
BUN/CREAT SERPL: 15.5 (ref 7–25)
CALCIUM SPEC-SCNC: 9.6 MG/DL (ref 8.6–10.5)
CHLORIDE SERPL-SCNC: 100 MMOL/L (ref 98–107)
CO2 SERPL-SCNC: 27 MMOL/L (ref 22–29)
CREAT SERPL-MCNC: 1.03 MG/DL (ref 0.76–1.27)
DEPRECATED RDW RBC AUTO: 39.3 FL (ref 37–54)
EGFRCR SERPLBLD CKD-EPI 2021: 95.4 ML/MIN/1.73
ERYTHROCYTE [DISTWIDTH] IN BLOOD BY AUTOMATED COUNT: 12.2 % (ref 12.3–15.4)
GLUCOSE SERPL-MCNC: 116 MG/DL (ref 65–99)
HCT VFR BLD AUTO: 47.7 % (ref 37.5–51)
HGB BLD-MCNC: 16.9 G/DL (ref 13–17.7)
MCH RBC QN AUTO: 30.9 PG (ref 26.6–33)
MCHC RBC AUTO-ENTMCNC: 35.4 G/DL (ref 31.5–35.7)
MCV RBC AUTO: 87.2 FL (ref 79–97)
PLATELET # BLD AUTO: 236 10*3/MM3 (ref 140–450)
PMV BLD AUTO: 8.9 FL (ref 6–12)
POTASSIUM SERPL-SCNC: 4.7 MMOL/L (ref 3.5–5.2)
QT INTERVAL: 376 MS
QTC INTERVAL: 381 MS
RBC # BLD AUTO: 5.47 10*6/MM3 (ref 4.14–5.8)
SODIUM SERPL-SCNC: 137 MMOL/L (ref 136–145)
WBC NRBC COR # BLD AUTO: 6 10*3/MM3 (ref 3.4–10.8)

## 2024-12-13 PROCEDURE — 93005 ELECTROCARDIOGRAM TRACING: CPT

## 2024-12-13 PROCEDURE — 85027 COMPLETE CBC AUTOMATED: CPT

## 2024-12-13 PROCEDURE — 36415 COLL VENOUS BLD VENIPUNCTURE: CPT

## 2024-12-13 PROCEDURE — 93010 ELECTROCARDIOGRAM REPORT: CPT | Performed by: INTERNAL MEDICINE

## 2024-12-13 PROCEDURE — 80048 BASIC METABOLIC PNL TOTAL CA: CPT

## 2024-12-13 RX ORDER — THYROID 60 MG/1
60 TABLET ORAL DAILY
COMMUNITY

## 2024-12-13 NOTE — PAT
Patient to apply Chlorhexadine wipes  to surgical area (as instructed) the night before procedure and the AM of procedure. Wipes provided.   Per Anesthesia Request, patient instructed not to take their ACE/ARB medications on the AM of surgery.

## 2024-12-14 DIAGNOSIS — I10 PRIMARY HYPERTENSION: ICD-10-CM

## 2024-12-16 RX ORDER — LOSARTAN POTASSIUM 100 MG/1
100 TABLET ORAL DAILY
Qty: 30 TABLET | Refills: 0 | OUTPATIENT
Start: 2024-12-16

## 2024-12-16 RX ORDER — AMLODIPINE BESYLATE 10 MG/1
10 TABLET ORAL DAILY
Qty: 30 TABLET | Refills: 0 | OUTPATIENT
Start: 2024-12-16

## 2024-12-17 ENCOUNTER — ANESTHESIA EVENT (OUTPATIENT)
Dept: PERIOP | Facility: HOSPITAL | Age: 39
End: 2024-12-17
Payer: COMMERCIAL

## 2024-12-18 ENCOUNTER — ANESTHESIA EVENT CONVERTED (OUTPATIENT)
Dept: ANESTHESIOLOGY | Facility: HOSPITAL | Age: 39
End: 2024-12-18
Payer: COMMERCIAL

## 2024-12-18 ENCOUNTER — HOSPITAL ENCOUNTER (OUTPATIENT)
Facility: HOSPITAL | Age: 39
Setting detail: HOSPITAL OUTPATIENT SURGERY
Discharge: HOME | End: 2024-12-18
Attending: SURGERY | Admitting: SURGERY
Payer: COMMERCIAL

## 2024-12-18 ENCOUNTER — ANESTHESIA (OUTPATIENT)
Dept: PERIOP | Facility: HOSPITAL | Age: 39
End: 2024-12-18
Payer: COMMERCIAL

## 2024-12-18 VITALS
DIASTOLIC BLOOD PRESSURE: 100 MMHG | SYSTOLIC BLOOD PRESSURE: 138 MMHG | OXYGEN SATURATION: 94 % | HEIGHT: 72 IN | RESPIRATION RATE: 18 BRPM | HEART RATE: 47 BPM | WEIGHT: 227 LBS | TEMPERATURE: 97.5 F | BODY MASS INDEX: 30.75 KG/M2

## 2024-12-18 DIAGNOSIS — I10 PRIMARY HYPERTENSION: ICD-10-CM

## 2024-12-18 PROCEDURE — 25010000002 ONDANSETRON PER 1 MG: Performed by: NURSE ANESTHETIST, CERTIFIED REGISTERED

## 2024-12-18 PROCEDURE — 25010000002 CEFAZOLIN PER 500 MG: Performed by: SURGERY

## 2024-12-18 PROCEDURE — 25810000003 LACTATED RINGERS PER 1000 ML: Performed by: ANESTHESIOLOGY

## 2024-12-18 PROCEDURE — C1781 MESH (IMPLANTABLE): HCPCS | Performed by: SURGERY

## 2024-12-18 PROCEDURE — 25010000002 BUPRENORPHINE PER 0.1 MG: Performed by: ANESTHESIOLOGY

## 2024-12-18 PROCEDURE — 25010000002 FENTANYL CITRATE (PF) 100 MCG/2ML SOLUTION: Performed by: NURSE ANESTHETIST, CERTIFIED REGISTERED

## 2024-12-18 PROCEDURE — 25010000002 FENTANYL CITRATE (PF) 50 MCG/ML SOLUTION: Performed by: NURSE ANESTHETIST, CERTIFIED REGISTERED

## 2024-12-18 PROCEDURE — 25010000002 DEXAMETHASONE SODIUM PHOSPHATE 10 MG/ML SOLUTION: Performed by: ANESTHESIOLOGY

## 2024-12-18 PROCEDURE — 25010000002 PROPOFOL 10 MG/ML EMULSION: Performed by: NURSE ANESTHETIST, CERTIFIED REGISTERED

## 2024-12-18 PROCEDURE — 25010000002 LIDOCAINE PF 1% 1 % SOLUTION: Performed by: NURSE ANESTHETIST, CERTIFIED REGISTERED

## 2024-12-18 PROCEDURE — 25010000002 FENTANYL CITRATE (PF) 50 MCG/ML SOLUTION

## 2024-12-18 PROCEDURE — 25010000002 BUPIVACAINE (PF) 0.25 % SOLUTION: Performed by: ANESTHESIOLOGY

## 2024-12-18 PROCEDURE — 25010000002 DEXAMETHASONE PER 1 MG: Performed by: NURSE ANESTHETIST, CERTIFIED REGISTERED

## 2024-12-18 PROCEDURE — 25010000002 SUGAMMADEX 200 MG/2ML SOLUTION: Performed by: NURSE ANESTHETIST, CERTIFIED REGISTERED

## 2024-12-18 PROCEDURE — 25010000002 KETOROLAC TROMETHAMINE PER 15 MG

## 2024-12-18 PROCEDURE — 25010000002 HYDROMORPHONE 1 MG/ML SOLUTION

## 2024-12-18 PROCEDURE — 25010000002 LIDOCAINE PF 1% 1 % SOLUTION: Performed by: ANESTHESIOLOGY

## 2024-12-18 DEVICE — DEV WND/CLS STRATAFIX SPIRALPDS PLS CT 2/0 15CM 26MM VIL: Type: IMPLANTABLE DEVICE | Site: ABDOMEN | Status: FUNCTIONAL

## 2024-12-18 DEVICE — 3DMAX MID ANATOMICAL MESH, 10 CM X 16 CM (4" X 6"), LARGE, RIGHT
Type: IMPLANTABLE DEVICE | Site: ABDOMEN | Status: FUNCTIONAL
Brand: 3DMAX

## 2024-12-18 DEVICE — IMPLANTABLE DEVICE: Type: IMPLANTABLE DEVICE | Site: ABDOMEN | Status: FUNCTIONAL

## 2024-12-18 RX ORDER — DEXAMETHASONE SODIUM PHOSPHATE 10 MG/ML
INJECTION, SOLUTION INTRAMUSCULAR; INTRAVENOUS
Status: COMPLETED | OUTPATIENT
Start: 2024-12-18 | End: 2024-12-18

## 2024-12-18 RX ORDER — PROPOFOL 10 MG/ML
VIAL (ML) INTRAVENOUS AS NEEDED
Status: DISCONTINUED | OUTPATIENT
Start: 2024-12-18 | End: 2024-12-18 | Stop reason: SURG

## 2024-12-18 RX ORDER — SODIUM CHLORIDE 0.9 % (FLUSH) 0.9 %
10 SYRINGE (ML) INJECTION AS NEEDED
Status: DISCONTINUED | OUTPATIENT
Start: 2024-12-18 | End: 2024-12-18 | Stop reason: HOSPADM

## 2024-12-18 RX ORDER — SODIUM CHLORIDE, SODIUM LACTATE, POTASSIUM CHLORIDE, CALCIUM CHLORIDE 600; 310; 30; 20 MG/100ML; MG/100ML; MG/100ML; MG/100ML
9 INJECTION, SOLUTION INTRAVENOUS CONTINUOUS
Status: DISCONTINUED | OUTPATIENT
Start: 2024-12-19 | End: 2024-12-18 | Stop reason: HOSPADM

## 2024-12-18 RX ORDER — FENTANYL CITRATE 50 UG/ML
INJECTION, SOLUTION INTRAMUSCULAR; INTRAVENOUS
Status: COMPLETED
Start: 2024-12-18 | End: 2024-12-18

## 2024-12-18 RX ORDER — HYDROMORPHONE HYDROCHLORIDE 1 MG/ML
0.5 INJECTION, SOLUTION INTRAMUSCULAR; INTRAVENOUS; SUBCUTANEOUS
Status: DISCONTINUED | OUTPATIENT
Start: 2024-12-18 | End: 2024-12-18 | Stop reason: HOSPADM

## 2024-12-18 RX ORDER — FENTANYL CITRATE 50 UG/ML
50 INJECTION, SOLUTION INTRAMUSCULAR; INTRAVENOUS
Status: DISCONTINUED | OUTPATIENT
Start: 2024-12-18 | End: 2024-12-18 | Stop reason: HOSPADM

## 2024-12-18 RX ORDER — ONDANSETRON 2 MG/ML
4 INJECTION INTRAMUSCULAR; INTRAVENOUS ONCE AS NEEDED
Status: DISCONTINUED | OUTPATIENT
Start: 2024-12-18 | End: 2024-12-18 | Stop reason: HOSPADM

## 2024-12-18 RX ORDER — SCOLOPAMINE TRANSDERMAL SYSTEM 1 MG/1
1 PATCH, EXTENDED RELEASE TRANSDERMAL
Status: DISCONTINUED | OUTPATIENT
Start: 2024-12-18 | End: 2024-12-18 | Stop reason: HOSPADM

## 2024-12-18 RX ORDER — SODIUM CHLORIDE 0.9 % (FLUSH) 0.9 %
10 SYRINGE (ML) INJECTION EVERY 12 HOURS SCHEDULED
Status: DISCONTINUED | OUTPATIENT
Start: 2024-12-18 | End: 2024-12-18 | Stop reason: HOSPADM

## 2024-12-18 RX ORDER — BUPRENORPHINE HYDROCHLORIDE 0.32 MG/ML
INJECTION INTRAMUSCULAR; INTRAVENOUS
Status: COMPLETED | OUTPATIENT
Start: 2024-12-18 | End: 2024-12-18

## 2024-12-18 RX ORDER — KETOROLAC TROMETHAMINE 30 MG/ML
30 INJECTION, SOLUTION INTRAMUSCULAR; INTRAVENOUS ONCE AS NEEDED
Status: COMPLETED | OUTPATIENT
Start: 2024-12-18 | End: 2024-12-18

## 2024-12-18 RX ORDER — FAMOTIDINE 20 MG/1
20 TABLET, FILM COATED ORAL ONCE
Status: COMPLETED | OUTPATIENT
Start: 2024-12-18 | End: 2024-12-18

## 2024-12-18 RX ORDER — DEXAMETHASONE SODIUM PHOSPHATE 4 MG/ML
INJECTION, SOLUTION INTRA-ARTICULAR; INTRALESIONAL; INTRAMUSCULAR; INTRAVENOUS; SOFT TISSUE AS NEEDED
Status: DISCONTINUED | OUTPATIENT
Start: 2024-12-18 | End: 2024-12-18 | Stop reason: SURG

## 2024-12-18 RX ORDER — LIDOCAINE HYDROCHLORIDE 10 MG/ML
INJECTION, SOLUTION EPIDURAL; INFILTRATION; INTRACAUDAL; PERINEURAL AS NEEDED
Status: DISCONTINUED | OUTPATIENT
Start: 2024-12-18 | End: 2024-12-18 | Stop reason: SURG

## 2024-12-18 RX ORDER — ROCURONIUM BROMIDE 10 MG/ML
INJECTION, SOLUTION INTRAVENOUS AS NEEDED
Status: DISCONTINUED | OUTPATIENT
Start: 2024-12-18 | End: 2024-12-18 | Stop reason: SURG

## 2024-12-18 RX ORDER — AMLODIPINE BESYLATE 10 MG/1
10 TABLET ORAL DAILY
Qty: 90 TABLET | Refills: 2 | Status: SHIPPED | OUTPATIENT
Start: 2024-12-18

## 2024-12-18 RX ORDER — LABETALOL HYDROCHLORIDE 5 MG/ML
INJECTION, SOLUTION INTRAVENOUS AS NEEDED
Status: DISCONTINUED | OUTPATIENT
Start: 2024-12-18 | End: 2024-12-18 | Stop reason: SURG

## 2024-12-18 RX ORDER — KETOROLAC TROMETHAMINE 30 MG/ML
INJECTION, SOLUTION INTRAMUSCULAR; INTRAVENOUS
Status: COMPLETED
Start: 2024-12-18 | End: 2024-12-18

## 2024-12-18 RX ORDER — FENTANYL CITRATE 50 UG/ML
INJECTION, SOLUTION INTRAMUSCULAR; INTRAVENOUS AS NEEDED
Status: DISCONTINUED | OUTPATIENT
Start: 2024-12-18 | End: 2024-12-18 | Stop reason: SURG

## 2024-12-18 RX ORDER — ONDANSETRON 2 MG/ML
INJECTION INTRAMUSCULAR; INTRAVENOUS AS NEEDED
Status: DISCONTINUED | OUTPATIENT
Start: 2024-12-18 | End: 2024-12-18 | Stop reason: SURG

## 2024-12-18 RX ORDER — MIDAZOLAM HYDROCHLORIDE 1 MG/ML
1 INJECTION, SOLUTION INTRAMUSCULAR; INTRAVENOUS
Status: DISCONTINUED | OUTPATIENT
Start: 2024-12-18 | End: 2024-12-18 | Stop reason: HOSPADM

## 2024-12-18 RX ORDER — ONDANSETRON 4 MG/1
4 TABLET, FILM COATED ORAL EVERY 8 HOURS PRN
Qty: 12 TABLET | Refills: 0 | Status: SHIPPED | OUTPATIENT
Start: 2024-12-18 | End: 2025-12-18

## 2024-12-18 RX ORDER — BUPIVACAINE HYDROCHLORIDE 2.5 MG/ML
INJECTION, SOLUTION EPIDURAL; INFILTRATION; INTRACAUDAL
Status: COMPLETED | OUTPATIENT
Start: 2024-12-18 | End: 2024-12-18

## 2024-12-18 RX ORDER — LOSARTAN POTASSIUM 100 MG/1
100 TABLET ORAL DAILY
Qty: 90 TABLET | Refills: 2 | Status: SHIPPED | OUTPATIENT
Start: 2024-12-18

## 2024-12-18 RX ORDER — LIDOCAINE HYDROCHLORIDE 10 MG/ML
0.5 INJECTION, SOLUTION EPIDURAL; INFILTRATION; INTRACAUDAL; PERINEURAL ONCE AS NEEDED
Status: COMPLETED | OUTPATIENT
Start: 2024-12-18 | End: 2024-12-18

## 2024-12-18 RX ORDER — DOCUSATE SODIUM 100 MG/1
100 CAPSULE, LIQUID FILLED ORAL 2 TIMES DAILY
Qty: 30 CAPSULE | Refills: 1 | Status: SHIPPED | OUTPATIENT
Start: 2024-12-18 | End: 2025-12-18

## 2024-12-18 RX ORDER — FAMOTIDINE 10 MG/ML
20 INJECTION, SOLUTION INTRAVENOUS ONCE
Status: DISCONTINUED | OUTPATIENT
Start: 2024-12-18 | End: 2024-12-18

## 2024-12-18 RX ORDER — OXYCODONE AND ACETAMINOPHEN 5; 325 MG/1; MG/1
1 TABLET ORAL EVERY 4 HOURS PRN
Qty: 12 TABLET | Refills: 0 | Status: SHIPPED | OUTPATIENT
Start: 2024-12-18

## 2024-12-18 RX ADMIN — FAMOTIDINE 20 MG: 20 TABLET, FILM COATED ORAL at 07:03

## 2024-12-18 RX ADMIN — MUPIROCIN 1 APPLICATION: 20 OINTMENT TOPICAL at 07:04

## 2024-12-18 RX ADMIN — PROPOFOL 25 MCG/KG/MIN: 10 INJECTION, EMULSION INTRAVENOUS at 07:38

## 2024-12-18 RX ADMIN — ROCURONIUM BROMIDE 50 MG: 10 INJECTION INTRAVENOUS at 07:32

## 2024-12-18 RX ADMIN — BUPIVACAINE HYDROCHLORIDE 60 ML: 2.5 INJECTION, SOLUTION EPIDURAL; INFILTRATION; INTRACAUDAL; PERINEURAL at 07:36

## 2024-12-18 RX ADMIN — Medication 10 MG: at 07:53

## 2024-12-18 RX ADMIN — SODIUM CHLORIDE, POTASSIUM CHLORIDE, SODIUM LACTATE AND CALCIUM CHLORIDE: 600; 310; 30; 20 INJECTION, SOLUTION INTRAVENOUS at 08:43

## 2024-12-18 RX ADMIN — LIDOCAINE HYDROCHLORIDE 0.5 ML: 10 INJECTION, SOLUTION EPIDURAL; INFILTRATION; INTRACAUDAL; PERINEURAL at 07:00

## 2024-12-18 RX ADMIN — FENTANYL CITRATE 50 MCG: 50 INJECTION, SOLUTION INTRAMUSCULAR; INTRAVENOUS at 09:30

## 2024-12-18 RX ADMIN — SODIUM CHLORIDE, POTASSIUM CHLORIDE, SODIUM LACTATE AND CALCIUM CHLORIDE 9 ML/HR: 600; 310; 30; 20 INJECTION, SOLUTION INTRAVENOUS at 07:00

## 2024-12-18 RX ADMIN — DEXAMETHASONE SODIUM PHOSPHATE 2 MG: 10 INJECTION INTRAMUSCULAR; INTRAVENOUS at 07:36

## 2024-12-18 RX ADMIN — PROPOFOL 300 MG: 10 INJECTION, EMULSION INTRAVENOUS at 07:32

## 2024-12-18 RX ADMIN — SUGAMMADEX 200 MG: 100 INJECTION, SOLUTION INTRAVENOUS at 09:00

## 2024-12-18 RX ADMIN — Medication 10 MG: at 09:15

## 2024-12-18 RX ADMIN — KETOROLAC TROMETHAMINE 30 MG: 30 INJECTION, SOLUTION INTRAMUSCULAR; INTRAVENOUS at 09:29

## 2024-12-18 RX ADMIN — ONDANSETRON 4 MG: 2 INJECTION INTRAMUSCULAR; INTRAVENOUS at 08:58

## 2024-12-18 RX ADMIN — FENTANYL CITRATE 100 MCG: 50 INJECTION, SOLUTION INTRAMUSCULAR; INTRAVENOUS at 07:32

## 2024-12-18 RX ADMIN — FENTANYL CITRATE 50 MCG: 50 INJECTION, SOLUTION INTRAMUSCULAR; INTRAVENOUS at 09:56

## 2024-12-18 RX ADMIN — DEXAMETHASONE SODIUM PHOSPHATE 8 MG: 4 INJECTION INTRA-ARTICULAR; INTRALESIONAL; INTRAMUSCULAR; INTRAVENOUS; SOFT TISSUE at 07:38

## 2024-12-18 RX ADMIN — LIDOCAINE HYDROCHLORIDE 50 MG: 10 INJECTION, SOLUTION EPIDURAL; INFILTRATION; INTRACAUDAL; PERINEURAL at 07:32

## 2024-12-18 RX ADMIN — SODIUM CHLORIDE 2000 MG: 900 INJECTION INTRAVENOUS at 07:30

## 2024-12-18 RX ADMIN — SCOPOLAMINE 1 PATCH: 1.5 PATCH, EXTENDED RELEASE TRANSDERMAL at 07:03

## 2024-12-18 RX ADMIN — BUPRENORPHINE HYDROCHLORIDE 0.3 MG: 0.32 INJECTION INTRAMUSCULAR; INTRAVENOUS at 07:36

## 2024-12-18 RX ADMIN — HYDROMORPHONE HYDROCHLORIDE 0.5 MG: 1 INJECTION, SOLUTION INTRAMUSCULAR; INTRAVENOUS; SUBCUTANEOUS at 09:50

## 2024-12-18 NOTE — ANESTHESIA POSTPROCEDURE EVALUATION
Patient: Scott Araujo    Procedure Summary       Date: 12/18/24 [facility] / [facility]:  [facility]    Anesthesia Start: 0728 Anesthesia Stop: 0919    Procedure: ROBOTIC ASSISTED LAPAROSCOPIC BILATERAL INGUINAL HERNIA REPAIR WITH MESH (Left: Abdomen) Diagnosis:     Surgeons: Bello Chand MD Provider: Honorio Galindo MD    Anesthesia Type: general ASA Status: 2            Anesthesia Type: general    Vitals  Vitals Value Taken Time   /104 12/18/24 0915   Temp     Pulse 83 12/18/24 0919   Resp     SpO2 98 % 12/18/24 0919   Vitals shown include unfiled device data.        Post Anesthesia Care and Evaluation    Patient location during evaluation: PACU  Patient participation: complete - patient participated  Level of consciousness: awake and alert  Pain management: adequate    Airway patency: patent  Anesthetic complications: No anesthetic complications  PONV Status: none  Cardiovascular status: hemodynamically stable and acceptable  Respiratory status: nonlabored ventilation, acceptable and nasal cannula  Hydration status: acceptable

## 2024-12-18 NOTE — ANESTHESIA PROCEDURE NOTES
Airway  Urgency: elective    Date/Time: 12/18/2024 7:34 AM  Airway not difficult    General Information and Staff    Patient location during procedure: OR  CRNA/CAA: Wesley Zamora CRNA    Indications and Patient Condition  Indications for airway management: airway protection    Preoxygenated: yes  MILS not maintained throughout  Mask difficulty assessment: 2 - vent by mask + OA or adjuvant +/- NMBA    Final Airway Details  Final airway type: endotracheal airway      Successful airway: ETT  Cuffed: yes   Successful intubation technique: direct laryngoscopy  Facilitating devices/methods: intubating stylet  Endotracheal tube insertion site: oral  Blade: Hill  Blade size: 2  ETT size (mm): 7.0  Cormack-Lehane Classification: grade IIa - partial view of glottis  Placement verified by: chest auscultation and capnometry   Cuff volume (mL): 8  Measured from: lips  ETT/EBT  to lips (cm): 22  Number of attempts at approach: 1  Assessment: lips, teeth, and gum same as pre-op and atraumatic intubation    Additional Comments  Negative epigastric sounds, Breath sound equal bilaterally with symmetric chest rise and fall

## 2024-12-18 NOTE — TELEPHONE ENCOUNTER
Patient just had surgery, appointment scheduled on 1/17/2025, please send refill patient is out of meds.

## 2024-12-18 NOTE — H&P
Pre-Op H&P  Scott Araujo  [id number]  1985      Chief complaint: Left Inguinal Hernia      Subjective:  Patient is a 39 y.o.male presents for scheduled surgery by Dr. Chand. He anticipates a ROBOTIC ASSISTED LAPAROSCOPIC LEFT INGUINAL HERNIA REPAIR WITH MESH, POSSIBLE BILATERAL - Left today.  The patient first noticed his left inguinal hernia in 2021.  He reinjured it in May of 2024.  He endorses having some soreness on occasion.  He denies having any pain associated with his present condition.    Review of Systems:  Constitutional-- No fever, chills or sweats. No fatigue.  CV-- No chest pain, palpitation or syncope. +HTN, HLD.  Resp-- No SOB, cough, hemoptysis  Skin--No rashes or lesions      Allergies:   Allergies   Allergen Reactions    Penicillins Hives         Beta lactam allergy details  Antibiotic reaction: hives  Age at reaction: child  Dose to reaction time: unknown  Reason for antibiotic: unknown  Epinephrine required for reaction?: no  Tolerated antibiotics: other (ZPAK)            Home Meds:  Medications Prior to Admission   Medication Sig Dispense Refill Last Dose/Taking    amLODIPine (NORVASC) 10 MG tablet Take 1 tablet by mouth Daily. 30 tablet 1 12/17/2024    B Complex Vitamins (B COMPLEX 1 PO) Take 1 tablet by mouth Every Evening.   Past Week    losartan (COZAAR) 100 MG tablet Take 1 tablet by mouth Daily. 30 tablet 1 12/17/2024    NON FORMULARY Take 2 tablets by mouth Every Morning. Ultimate omega   12/17/2024    NON FORMULARY Take 2 tablets by mouth Every Evening.  mg (optimizing horomone balance)   Past Week    NON FORMULARY Take 1 tablet by mouth Every Evening. ADK   Past Week    NON FORMULARY Take 1 tablet by mouth Every Evening. Iodine Complete   Past Week    Thyroid 60 MG PO tablet Take 1 tablet by mouth Daily.   12/17/2024    albuterol sulfate HFA (Ventolin HFA) 108 (90 Base) MCG/ACT inhaler Inhale 2 puffs Every 6 (Six) Hours As Needed for Wheezing. 18 g 3 More than  "a month         PMH:   Past Medical History:   Diagnosis Date    Allergic     Seasonal and possible to PCN (hives as a child)    Allergic reaction to alpha-gal     Asthma     Exercised induced as a child    GERD (gastroesophageal reflux disease)     Hyperlipidemia     Hypertension      PSH:    Past Surgical History:   Procedure Laterality Date    ADENOIDECTOMY      TONSILLECTOMY      VASECTOMY         Immunization History:  Influenza: No  Pneumococcal: Unsure  Tetanus: Yes  Covid : x2    Social History:   Tobacco:   Social History     Tobacco Use   Smoking Status Never   Smokeless Tobacco Never      Alcohol:     Social History     Substance and Sexual Activity   Alcohol Use Yes    Alcohol/week: 12.0 standard drinks of alcohol    Types: 12 Shots of liquor per week         Physical Exam:/97 (BP Location: Right arm, Patient Position: Sitting)   Pulse 66   Temp 98.6 °F (37 °C) (Temporal)   Resp 18   Ht 182.9 cm (72\")   Wt 103 kg (227 lb)   SpO2 97%   BMI 30.79 kg/m²       General Appearance:    Alert, cooperative, no distress, appears stated age   Head:    Normocephalic, without obvious abnormality, atraumatic   Lungs:     Clear to auscultation bilaterally, respirations unlabored    Heart:   Regular rate and rhythm, S1 and S2 normal    Abdomen:    Soft without tenderness   Extremities:   Extremities normal, atraumatic, no cyanosis or edema   Skin:   Skin color, texture, turgor normal, no rashes or lesions   Neurologic:   Grossly intact     Results Review:     LABS:  Lab Results   Component Value Date    WBC 6.00 12/13/2024    HGB 16.9 12/13/2024    HCT 47.7 12/13/2024    MCV 87.2 12/13/2024     12/13/2024    GLUCOSE 116 (H) 12/13/2024    BUN 16 12/13/2024    CREATININE 1.03 12/13/2024    EGFRIFNONA 84 01/15/2021     12/13/2024    K 4.7 12/13/2024     12/13/2024    CO2 27.0 12/13/2024    CALCIUM 9.6 12/13/2024    ALBUMIN 4.80 06/10/2022    AST 22 06/10/2022    ALT 29 06/10/2022    BILITOT " 0.4 06/10/2022       RADIOLOGY:  Imaging Results (Last 72 Hours)       ** No results found for the last 72 hours. **            I reviewed the patient's new clinical results.    Cancer Staging (if applicable)  Cancer Patient: __ yes _x_no __unknown; If yes, clinical stage T:__ N:__M:__, stage group or __N/A      Impression: Left Inguinal Hernia, possible bilateral      Plan: ROBOTIC ASSISTED LAPAROSCOPIC LEFT INGUINAL HERNIA REPAIR WITH MESH, POSSIBLE BILATERAL - Left       Jeremy Tao, JOSE JUAN   12/18/2024   07:04 EST

## 2024-12-18 NOTE — OP NOTE
Operative Report    Patient Name:  Scott Araujo  YOB: 1985  [id number]    12/18/2024      PREOPERATIVE DIAGNOSIS: Reducible Left Inguinal Hernia      POSTOPERATIVE DIAGNOSIS: Bilateral inguinal hernias       PROCEDURE PERFORMED:     Robotic Assisted Laparoscopic Transabdominal Bilateral Inguinal Hernia Repair with Mesh       SURGEON: Bello Chand MD      ASSISTANT: None       SPECIMENS: None       ESTIMATED BLOOD LOSS: 10 mL     ANESTHESIA: General with TAP blocks.        FINDINGS:  1. A indirect inguinal hernia was encountered on the left side. This was completely reduced and repaired with a Large Bard 3D Max Mid Mesh in the preperitoneal space  2. A small direct inguinal hernia was encountered on the right side. This was completely reduced and repaired with a Large Bard 3D Max Mid Mesh in the preperitoneal space       INDICATIONS:      This patient is a 39 y.o. male who presented to my clinic with complaints of left inguinal bulge and pain. A history and physical exam was performed and found to be consistent with a reducible left inguinal hernia, with concern for bilateral hernias. The risks, benefits, and alternatives of the procedure were discussed with the patient and their family preoperatively and they agreed to proceed.          DESCRIPTION OF PROCEDURE:      After obtaining informed consent, the patient was taken to the operating room and placed in the supine position on the operating room table. General anesthesia was initiated. A Palacios catheter was placed. The abdomen was prepped and draped in the usual sterile fashion. A time-out was properly performed. Antibiotics were given preoperatively. SCDs were properly placed on the patient and turned on. A block was performed by the Anesthesia service prior to the start if the case.     Using an 11 blade scalpel, a small stab incision was made in the patient's left upper quadrant at Harden's point.  A Veress needle was then  inserted into the abdominal cavity with aspiration and water drop test reassuring.  Pneumoperitoneum was established to 15 mmHg.  Next utilizing a 5 mm 0 degree laparoscope as well as an 8 mm robotic Optiview trocar, the abdomen was entered in the midline epigastrium under direct laparoscopic visualization utilizing an Optiview technique.  The abdomen was surveyed with special attention to the viscera underlying the insertion site as well as underlying the Veress needle site which were both found to be free of injury.  The Veress needle was then removed. Next two additional 8 mm robotic trocars were placed laterally to the periumbilical trocar, one on the left and one on the right side. The abdomen was surveyed and laparoscopic exam demonstrated evidence of a small direct hernia on the right side and an indirect inguinal hernia on the left side.  A large Bard 3D Max Mid right and left-sided mesh was then inserted into the peritoneal cavity.  The da Kellee Xi robotic system was then docked at the patient's bedside and targeted in the pelvis.    A peritoneal incision was then created using cautery on the under-surface of the abdominal wall starting at the midline and proceeding laterally towards the ASIS on the left side. The peritoneum was then retracted downwards and the rectus muscle was swept upwards to develop the preperitoneal space.  The left preperitoneal space was dissected from the ASIS laterally to the pubic tubercle medially. The spermatic cord was retracted laterally which revealed a small indirect hernia sac. This was bluntly  from the cord structures and delivered posteriorly allowing the peritoneum to lie flat.  There was similarly a cord lipoma within the indirect space which was moderate in size and was completely reduced.  No direct hernia or femoral hernia defect was identified. With the dissection complete, the large Bard 3D max left-sided mesh was delivered into the preperitoneal space.  The inferior aspect of the mesh was placed inferior to Sidney's ligament and covering the sites of direct, indirect, and femoral potential defects.  The mesh was secured medially to Sidney's ligament and laterally to the abdominal wall using interrupted 2-0 Vicryl suture.  With this completed, the peritoneal flap was then elevated with great care to ensure proper apposition of the mesh between the peritoneum and rectus muscle without folding. The peritoneal flap was then closed using an absorbable 2-0 Stratafix suture.    Attention was then turned to the right side.  A peritoneal incision was then created using cautery on the under-surface of the abdominal wall starting at the midline and proceeding laterally towards the ASIS on the right side. The peritoneum was then retracted downwards and the rectus muscle was swept upwards to develop the preperitoneal space.  The right preperitoneal space was dissected from the ASIS laterally to the pubic tubercle medially. The spermatic cord was retracted laterally which revealed no obvious indirect hernia sac.. The peritoneum was bluntly  from the cord structures and delivered posteriorly allowing the peritoneum to lie flat.  There was a small direct hernia containing fat which was completely reduced in the course of the dissection.  There was no femoral defect.. With the dissection complete, the large Bard 3D max right-sided mesh was delivered into the preperitoneal space. The inferior aspect of the mesh was placed inferior to Sidney's ligament and covering the sites of direct, indirect, and femoral potential defects.  The mesh was secured medially to Sidney's ligament and laterally to the abdominal wall using interrupted 2-0 Vicryl suture.  With this completed, the peritoneal flap was then elevated with great care to ensure proper apposition of the mesh between the peritoneum and rectus muscle without folding. The peritoneal flap was then closed using an absorbable  2-0 V Stratafix suture.  The da Kellee XI robotic system was then undocked from the patient's bedside.          The abdomen was then carefully surveyed and hemostasis confirmed. The fascia of the midline trocar site was closed under direct laparoscopic visualization utilizing a Sd-Roly device with an 0 Vicryl suture.  The 8 mm trocars were removed under direct laparoscopic visualization and pneumoperitoneum was released. Hemostasis of all wound sites was confirmed and each wound site was irrigated. All skin incisions were then closed with 4-0 Monocryl in the subcuticular layer. Mastisol and Steri-Strips were then applied overlying each incision site.      After the procedure, the patient was awakened, extubated, and taken to the postoperative anesthesia care unit for recovery. All needle, instrument, and lap counts were correct. I was personally present and performed all portions of the procedure. There were no immediate complications         Bello Chand MD  12/18/2024  09:08 EST

## 2024-12-18 NOTE — ANESTHESIA PREPROCEDURE EVALUATION
Anesthesia Evaluation                  Airway   Mallampati: I  TM distance: >3 FB  Neck ROM: full  No difficulty expected  Dental      Pulmonary    (+) asthma,  Cardiovascular     ECG reviewed    (+) hypertension      Neuro/Psych  GI/Hepatic/Renal/Endo    (+) GERD, thyroid problem     Musculoskeletal     Abdominal    Substance History      OB/GYN          Other                      Anesthesia Plan    ASA 2     general     (taps)  intravenous induction     Anesthetic plan, risks, benefits, and alternatives have been provided, discussed and informed consent has been obtained with: patient.    Plan discussed with CRNA.    CODE STATUS:

## 2024-12-18 NOTE — ANESTHESIA PROCEDURE NOTES
"Peripheral Block      Patient reassessed immediately prior to procedure    Patient location during procedure: OR  Reason for block: at surgeon's request and post-op pain management  Performed by  CRNA/CAA: Wesley Zamora CRNA  Preanesthetic Checklist  Completed: patient identified, IV checked, site marked, risks and benefits discussed, surgical consent, monitors and equipment checked, pre-op evaluation and timeout performed  Prep:  Pt Position: supine  Sterile barriers:cap, gloves, mask and washed/disinfected hands  Prep: ChloraPrep  Patient monitoring: blood pressure monitoring, continuous pulse oximetry and EKG  Procedure    Sedation: yes  Performed under: general  Guidance:ultrasound guided  Images:still images obtained, printed/placed on chart    Laterality:Bilateral  Block Type:TAP  Injection Technique:single-shot  Needle Type:short-bevel and echogenic  Needle Gauge:20 G  Resistance on Injection: none    Medications Used: buprenorphine (BUPRENEX) injection - Injection   0.3 mg - 12/18/2024 7:36:00 AM  dexamethasone sodium phosphate injection - Injection   2 mg - 12/18/2024 7:36:00 AM  bupivacaine PF (MARCAINE) 0.25 % injection - Injection   60 mL - 12/18/2024 7:36:00 AM      Medications  Comment:Block Injection:  LA dose divided between Right and Left block        Post Assessment  Injection Assessment: negative aspiration for heme, incremental injection and no paresthesia on injection  Patient Tolerance:comfortable throughout block  Complications:no  Additional Notes    Subcostal TAPs    A high-frequency linear transducer, with sterile cover, was placed sub-xiphoid to identify [place]ba, right and left Rectus Abdominus Muscles (ROBERT). The transducer was moved either right or left subcostally to identify the ROBERT and the Transverse Abdominus Muscle (RODGERS). The insertion site was prepped in sterile fashion and then localized with 2-5 ml of 1% Lidocaine. Using ultrasound-guidance, a 20-gauge B-Arora 4\" Ultraplex 360 " "non-stimulating echogenic needle was advanced in plane, from medial to lateral, until the tip of the needle was in the fascial plane between the ROBERT and RODGERS. 1-3ml of preservative free normal saline was used to hydro-dissect the fascial planes. After the fascial plane was verified, the local anesthetic (LA) was injected. The procedure was repeated on the opposite side for bilateral coverage. Aspiration every 5 ml to prevent intravascular injection. Injection was completed with negative aspiration of blood and negative intravascular injection. Injection pressures were normal with minimal resistance. The subcostal approach to the TAP nerve block ideally anesthetizes the intercostal nerves T6-T9.     Mid-Axillary/Lateral TAPs    A high-frequency linear transducer, with sterile cover, was placed in the midaxillary line between the ASIS and costal margin. The External Oblique Muscle (EOM), Internal Oblique Muscle (IOM), Transverse Abdominus Muscle (RODGERS), and Peritoneum were identified. The insertion site was prepped in sterile fashion and then localized with 2-5 ml of 1% Lidocaine. Using ultrasound-guidance, a 20-gauge B-Arora 4\" Ultraplex 360 non-stimulating echogenic needle was advanced in plane, from medial to lateral, until the tip of the needle was in the fascial plane between the IOM and RODGERS. 1-3ml of preservative free normal saline was used to hydro-dissect the fascial planes. After the fascial plane was verified, the local anesthetic (LA) was injected. The procedure was repeated on the opposite side for bilateral coverage. Aspiration every 5 ml to prevent intravascular injection. Injection was completed with negative aspiration of blood and negative intravascular injection. Injection pressures were normal with minimal resistance. Midaxillary TAPs should reach intercostal nerves T10- T11 and the subcostal nerve T12.    Performed by: Wesley Zamora, PRAKASH            "

## 2024-12-19 ENCOUNTER — TELEPHONE (OUTPATIENT)
Dept: INTERNAL MEDICINE | Facility: CLINIC | Age: 39
End: 2024-12-19
Payer: COMMERCIAL

## 2024-12-19 DIAGNOSIS — I10 PRIMARY HYPERTENSION: ICD-10-CM

## 2024-12-19 RX ORDER — AMLODIPINE BESYLATE 10 MG/1
10 TABLET ORAL DAILY
Qty: 90 TABLET | Refills: 2 | OUTPATIENT
Start: 2024-12-19

## 2024-12-19 RX ORDER — LOSARTAN POTASSIUM 100 MG/1
100 TABLET ORAL DAILY
Qty: 90 TABLET | Refills: 2 | OUTPATIENT
Start: 2024-12-19

## 2024-12-19 NOTE — TELEPHONE ENCOUNTER
Caller:  SATISH SIDDIQI    Relationship:  SELF    Best call back number:      Which medication are you concerned about: LOSARTAN AND AMLODIPINE    Who prescribed you this medication: DR MONSON    When did you start taking this medication: ONGOING    What are your concerns: ADVISED PATIENT THESE WERE CONFIRMED BY PHARMACY AS HE WAS CHECKING ON STATUS OF REFILLS

## 2025-01-17 ENCOUNTER — OFFICE VISIT (OUTPATIENT)
Dept: INTERNAL MEDICINE | Facility: CLINIC | Age: 40
End: 2025-01-17
Payer: COMMERCIAL

## 2025-01-17 VITALS
HEIGHT: 72 IN | RESPIRATION RATE: 16 BRPM | WEIGHT: 230 LBS | HEART RATE: 68 BPM | BODY MASS INDEX: 31.15 KG/M2 | TEMPERATURE: 97.8 F | SYSTOLIC BLOOD PRESSURE: 130 MMHG | DIASTOLIC BLOOD PRESSURE: 90 MMHG

## 2025-01-17 DIAGNOSIS — Z13.220 LIPID SCREENING: ICD-10-CM

## 2025-01-17 DIAGNOSIS — Z00.00 WELL ADULT EXAM: Primary | ICD-10-CM

## 2025-01-17 DIAGNOSIS — J45.20 MILD INTERMITTENT ASTHMA, UNSPECIFIED WHETHER COMPLICATED: ICD-10-CM

## 2025-01-17 DIAGNOSIS — I10 PRIMARY HYPERTENSION: ICD-10-CM

## 2025-01-17 DIAGNOSIS — Z23 NEED FOR IMMUNIZATION AGAINST INFLUENZA: ICD-10-CM

## 2025-01-17 LAB
ALBUMIN/CREATININE RATIO, URINE: <30
DEPRECATED RDW RBC AUTO: 39.5 FL (ref 37–54)
ERYTHROCYTE [DISTWIDTH] IN BLOOD BY AUTOMATED COUNT: 13 % (ref 12.3–15.4)
EXPIRATION DATE: NORMAL
HCT VFR BLD AUTO: 48 % (ref 37.5–51)
HGB BLD-MCNC: 17.1 G/DL (ref 13–17.7)
Lab: NORMAL
MCH RBC QN AUTO: 30.5 PG (ref 26.6–33)
MCHC RBC AUTO-ENTMCNC: 35.6 G/DL (ref 31.5–35.7)
MCV RBC AUTO: 85.7 FL (ref 79–97)
PLATELET # BLD AUTO: 254 10*3/MM3 (ref 140–450)
PMV BLD AUTO: 9.1 FL (ref 6–12)
POC CREATININE URINE: 10
POC MICROALBUMIN URINE: 10
RBC # BLD AUTO: 5.6 10*6/MM3 (ref 4.14–5.8)
WBC NRBC COR # BLD AUTO: 5.4 10*3/MM3 (ref 3.4–10.8)

## 2025-01-17 PROCEDURE — 80061 LIPID PANEL: CPT | Performed by: INTERNAL MEDICINE

## 2025-01-17 PROCEDURE — 84439 ASSAY OF FREE THYROXINE: CPT | Performed by: INTERNAL MEDICINE

## 2025-01-17 PROCEDURE — 80053 COMPREHEN METABOLIC PANEL: CPT | Performed by: INTERNAL MEDICINE

## 2025-01-17 PROCEDURE — 84443 ASSAY THYROID STIM HORMONE: CPT | Performed by: INTERNAL MEDICINE

## 2025-01-17 PROCEDURE — 85027 COMPLETE CBC AUTOMATED: CPT | Performed by: INTERNAL MEDICINE

## 2025-01-17 NOTE — PROGRESS NOTES
Chief Complaint  Annual Exam    Subjective    Scott Araujo is a 39 y.o. male.     Scott Araujo presents to [facility] for     History of Present Illness  The patient presents for a wellness visit.    He reports no significant health concerns at present. He is currently in the recovery phase following a bilateral hernia surgery, which was performed approximately 4 weeks ago. His asthma remains well-managed, with no need for inhaler use in the past 6 months. He acknowledges persistent hypertension and obesity but is actively pursuing weight loss strategies. His dietary modifications include reducing carbohydrate intake, eliminating junk and fast food, and abstaining from alcohol for the past 4 days. He expresses optimism about achieving significant weight loss through these lifestyle changes. He received his influenza vaccine today but has not received a COVID-19 vaccine in a long time and does not plan to get one. He had blood work done on 12/13/2024 as part of his pre-surgery evaluation, but it did not include triglycerides.    He also reports poor sleep quality, characterized by difficulty maintaining sleep beyond 2 hours, often waking up between midnight and 2 AM. He attributes this to a racing mind, preoccupied with business and patient-related matters. He hopes that his recent lifestyle modifications will improve his sleep and is considering a sleep study if there is no improvement. He has been informed by his wife that he snores heavily and has personally experienced episodes of gasping for air during sleep. He previously used a sleep apnea appliance, which was effective but caused significant bite misalignment, leading him to discontinue its use approximately 2 years ago.    SOCIAL HISTORY  The patient quit drinking alcohol 4 days ago and plans to abstain for about a month.    MEDICATIONS  Current: losartan, amlodipine, albuterol (as  "needed)    IMMUNIZATIONS  The patient received a flu shot today.         Complete Adult Physical          Diet; regular         Exercise: start on a regular basis         Social History   Decreasing ETOH consumption   No smoking         Preventative Screenings        Immunizations:         The following portions of the patient's history were reviewed and updated as appropriate: allergies, current medications, past family history, past medical history, past social history, past surgical history, and problem list.    Review of Systems   All other systems reviewed and are negative.      Objective   Body mass index is 30.81 kg/m².  BMI is >= 30 and <35. (Class 1 Obesity). The following options were offered after discussion;: none (medical contraindication)       Vital Signs:   /90 (BP Location: Right arm, Patient Position: Sitting, Cuff Size: Adult)   Pulse 68   Temp 97.8 °F (36.6 °C) (Temporal)   Resp 16   Ht 184 cm (72.44\")   Wt 104 kg (230 lb)   BMI 30.81 kg/m²       Physical Exam  Patient is alert x3, no distress.  Head is normocephalic, atraumatic. Pupils equal, light accommodation. Extraocular muscles intact. Moist membranes. Tympanic membranes clear bilaterally.  Neck is supple. No cervical lymphadenopathy, no goiter.  Chest is clear to auscultation, no rhonchi, no wheeze.  S1, S2. No murmurs, rubs or gallops.  Positive bowel sounds, soft, no mass, or tenderness. He does have surgical incision marks from the laparoscopic surgery umbilical and also inguinal marks consistent with his inguinal repair and intact.  No testicular mass and no inguinal hernia appreciated at this time.  Peripheral vascular, +2 pulses, warm. Extremity, good perfusion. Musculoskeletal exam plus 5 out of 5 both upper and lower proximal distribution.  Cranial nerves intact. +2 DTRs.    Vital Signs  Blood pressure is normal.       Results              Assessment and Plan  Diagnoses and all orders for this visit:  Assessment & " Plan  1. Hypertension.  Chronic and stable. His blood pressure is well-regulated with the current regimen of losartan and amlodipine. He has also demonstrated commendable progress in dietary modifications. The current treatment plan will be maintained.    2. Asthma.  Currently under control. He utilizes an albuterol inhaler on an as-needed basis, intermittently.    3. Sleep apnea.  He reports poor sleep quality, characterized by difficulty maintaining sleep beyond 2 hours, often waking up between midnight and 2 AM. He attributes this to a racing mind, preoccupied with business and patient-related matters. He hopes that his recent lifestyle modifications will improve his sleep and is considering a sleep study if there is no improvement. He has been informed by his wife that he snores heavily and has personally experienced episodes of gasping for air during sleep. He previously used a sleep apnea appliance, which was effective but caused significant bite misalignment, leading him to discontinue its use approximately 2 years ago.    4. Health maintenance.  A comprehensive discussion regarding lifestyle modifications and dietary improvements was conducted. He is committed to making healthier lifestyle choices, with the aim of achieving weight loss. Given his previous concerns related to potential sleep apnea, a reassessment will be conducted in 6 months to evaluate his weight loss progress and sleep quality. If any issues arise, he is encouraged to contact via [employer] email. Laboratory tests, including CBC, CMP, TSH, free T4, and lipid profile, will be ordered today.    Follow-up  The patient will follow up in 1 year for a complete physical examination.    PROCEDURE  The patient underwent bilateral hernia surgery approximately 4 weeks ago.     Diagnoses and all orders for this visit:    1. Well adult exam (Primary)    2. Need for immunization against influenza  -     Fluzone >6mos ([id number])    3. Primary  hypertension    4. Mild intermittent asthma, unspecified whether complicated                Follow Up   Return in about 1 year (around 1/17/2026) for Annual physical.  Patient was given instructions and counseling regarding his condition or for health maintenance advice. Please see specific information pulled into the AVS if appropriate.     Patient or patient representative verbalized consent for the use of Ambient Listening during the visit with  Nilo Braswell MD for chart documentation. 1/17/2025  11:29 EST

## 2025-01-18 LAB
ALBUMIN SERPL-MCNC: 4.4 G/DL (ref 3.5–5.2)
ALBUMIN/GLOB SERPL: 1.3 G/DL
ALP SERPL-CCNC: 50 U/L (ref 39–117)
ALT SERPL W P-5'-P-CCNC: 37 U/L (ref 1–41)
ANION GAP SERPL CALCULATED.3IONS-SCNC: 14.9 MMOL/L (ref 5–15)
AST SERPL-CCNC: 30 U/L (ref 1–40)
BILIRUB SERPL-MCNC: 0.4 MG/DL (ref 0–1.2)
BUN SERPL-MCNC: 15 MG/DL (ref 6–20)
BUN/CREAT SERPL: 12 (ref 7–25)
CALCIUM SPEC-SCNC: 9.5 MG/DL (ref 8.6–10.5)
CHLORIDE SERPL-SCNC: 101 MMOL/L (ref 98–107)
CHOLEST SERPL-MCNC: 247 MG/DL (ref 0–200)
CO2 SERPL-SCNC: 21.1 MMOL/L (ref 22–29)
CREAT SERPL-MCNC: 1.25 MG/DL (ref 0.76–1.27)
EGFRCR SERPLBLD CKD-EPI 2021: 75.6 ML/MIN/1.73
GLOBULIN UR ELPH-MCNC: 3.4 GM/DL
GLUCOSE SERPL-MCNC: 71 MG/DL (ref 65–99)
HDLC SERPL-MCNC: 31 MG/DL (ref 40–60)
LDLC SERPL CALC-MCNC: 167 MG/DL (ref 0–100)
LDLC/HDLC SERPL: 5.3 {RATIO}
POTASSIUM SERPL-SCNC: 4.2 MMOL/L (ref 3.5–5.2)
PROT SERPL-MCNC: 7.8 G/DL (ref 6–8.5)
SODIUM SERPL-SCNC: 137 MMOL/L (ref 136–145)
T4 FREE SERPL-MCNC: 1.22 NG/DL (ref 0.92–1.68)
TRIGL SERPL-MCNC: 259 MG/DL (ref 0–150)
TSH SERPL DL<=0.05 MIU/L-ACNC: 1.94 UIU/ML (ref 0.27–4.2)
VLDLC SERPL-MCNC: 49 MG/DL (ref 5–40)

## 2025-02-21 ENCOUNTER — OFFICE VISIT (OUTPATIENT)
Dept: INTERNAL MEDICINE | Facility: CLINIC | Age: 40
End: 2025-02-21
Payer: COMMERCIAL

## 2025-02-21 VITALS
SYSTOLIC BLOOD PRESSURE: 124 MMHG | WEIGHT: 218.8 LBS | DIASTOLIC BLOOD PRESSURE: 88 MMHG | TEMPERATURE: 97.8 F | HEART RATE: 72 BPM | OXYGEN SATURATION: 95 % | BODY MASS INDEX: 29.31 KG/M2

## 2025-02-21 DIAGNOSIS — J01.40 ACUTE NON-RECURRENT PANSINUSITIS: Primary | ICD-10-CM

## 2025-02-21 DIAGNOSIS — E78.2 MIXED HYPERLIPIDEMIA: ICD-10-CM

## 2025-02-21 DIAGNOSIS — R05.1 ACUTE COUGH: ICD-10-CM

## 2025-02-21 LAB
EXPIRATION DATE: NORMAL
FLUAV AG UPPER RESP QL IA.RAPID: NOT DETECTED
FLUBV AG UPPER RESP QL IA.RAPID: NOT DETECTED
INTERNAL CONTROL: NORMAL
Lab: NORMAL
SARS-COV-2 AG UPPER RESP QL IA.RAPID: NOT DETECTED

## 2025-02-21 PROCEDURE — 87428 SARSCOV & INF VIR A&B AG IA: CPT

## 2025-02-21 PROCEDURE — 99214 OFFICE O/P EST MOD 30 MIN: CPT

## 2025-02-21 RX ORDER — DOXYCYCLINE 100 MG/1
100 TABLET ORAL 2 TIMES DAILY
Qty: 10 TABLET | Refills: 0 | Status: SHIPPED | OUTPATIENT
Start: 2025-02-21 | End: 2025-02-26

## 2025-02-21 NOTE — PROGRESS NOTES
Chief Complaint  Illness (Since 2-16-25, cough, drainage, congestion, fatigue, sore throat)    Subjective          Scott Araujo presents to [facility]  Illness  Symptoms include chills, cough and sore throat.    Pertinent negative symptoms include no chest pain, no fever, no myalgias and no rash.     Patient presents to the office today with concerns of possible influenza.  He states that he has been feeling bad since early last week.  He reports he will experience chills at night, headaches, cough, congestion and lots of postnasal drainage.  He has had some ear congestion but no pain.  He reports a lot of sinus pressure in his frontal and maxillary sinuses.  He denies fevers or chills.  He denies chest pain or shortness of breath.  He has tried Mucinex, dextromethorphan and acetaminophen.  He also tried TheraFlu.      He is wondering if he needs to do a cardiac calcium score through CT based on his cholesterol levels.  He states that he has been trying to incorporate diet and lifestyle modifications.      Objective   Vital Signs:   /88   Pulse 72   Temp 97.8 °F (36.6 °C) (Infrared)   Wt 99.2 kg (218 lb 12.8 oz)   SpO2 95%   BMI 29.31 kg/m²     Body mass index is 29.31 kg/m².    Review of Systems   Constitutional:  Positive for chills. Negative for fever and unexpected weight loss.   HENT:  Positive for ear pain, postnasal drip, rhinorrhea, sinus pressure and sore throat.    Respiratory:  Positive for cough and wheezing. Negative for shortness of breath.    Cardiovascular:  Negative for chest pain.   Musculoskeletal:  Negative for myalgias.   Skin:  Negative for rash.       Past History:  Medical History: has a past medical history of Allergic, Allergic reaction to alpha-gal, Asthma, GERD (gastroesophageal reflux disease), Hyperlipidemia, and Hypertension.   Surgical History: has a past surgical history that includes Vasectomy; Tonsillectomy;  Adenoidectomy; Inguinal Hernia Repair (Bilateral, 12/18/2024); Hernia repair (12/18/24); and Inguinal hernia repair.   Family History: family history includes Cancer in his father; Diabetes in his maternal grandmother; Early death in his paternal grandfather; Heart disease in his paternal grandfather; Hypertension in his father and mother; Kidney disease in his father.   Social History: reports that he has never smoked. He has never used smokeless tobacco. He reports current alcohol use of about 12.0 standard drinks of alcohol per week. He reports current drug use. Drug: Marijuana.      Current Outpatient Medications:     albuterol sulfate HFA (Ventolin HFA) 108 (90 Base) MCG/ACT inhaler, Inhale 2 puffs Every 6 (Six) Hours As Needed for Wheezing., Disp: 18 g, Rfl: 3    amLODIPine (NORVASC) 10 MG tablet, Take 1 tablet by mouth once daily, Disp: 90 tablet, Rfl: 2    B Complex Vitamins (B COMPLEX 1 PO), Take 1 tablet by mouth Every Evening., Disp: , Rfl:     losartan (COZAAR) 100 MG tablet, Take 1 tablet by mouth once daily, Disp: 90 tablet, Rfl: 2    NON FORMULARY, Take 2 tablets by mouth Every Morning. Ultimate omega, Disp: , Rfl:     NON FORMULARY, Take 2 tablets by mouth Every Evening.  mg (optimizing horomone balance), Disp: , Rfl:     NON FORMULARY, Take 1 tablet by mouth Every Evening. ADK, Disp: , Rfl:     NON FORMULARY, Take 1 tablet by mouth Every Evening. Iodine Complete, Disp: , Rfl:     Thyroid 60 MG PO tablet, Take 1 tablet by mouth Daily., Disp: , Rfl:     doxycycline (ADOXA) 100 MG tablet, Take 1 tablet by mouth 2 (Two) Times a Day for 5 days., Disp: 10 tablet, Rfl: 0    Allergies: Penicillins    Physical Exam  Vitals and nursing note reviewed.   Constitutional:       General: He is not in acute distress.     Appearance: He is not ill-appearing or toxic-appearing.   HENT:      Head: Normocephalic and atraumatic.      Right Ear: Ear canal and external ear normal.      Left Ear: Ear canal and  external ear normal.      Nose: Congestion and rhinorrhea present.      Right Sinus: Maxillary sinus tenderness and frontal sinus tenderness present.      Left Sinus: Maxillary sinus tenderness and frontal sinus tenderness present.      Mouth/Throat:      Pharynx: Posterior oropharyngeal erythema (cobblestoning) present.   Eyes:      General: No scleral icterus.  Cardiovascular:      Rate and Rhythm: Normal rate and regular rhythm.   Pulmonary:      Effort: Pulmonary effort is normal.      Breath sounds: Normal breath sounds.   Neurological:      Mental Status: He is alert.          Result Review :               Latest Reference Range & Units 02/21/25 12:58   SARS Antigen Not Detected, Presumptive Negative  Not Detected   Expiration Date  11/21/25   Lot Number  [id number]   Influenza A Antigen VALENTINO Not Detected  Not Detected   Influenza B Antigen VALENTINO Not Detected  Not Detected          Assessment and Plan    Assessment & Plan  Acute cough  Point of care COVID and influenza testing negative in office today.   Orders:    POCT SARS-CoV-2 Antigen VALENTINO + Flu    Acute non-recurrent pansinusitis  He is not ill-appearing.  He is not hypertensive or hypotensive.  He is afebrile.  He is not tachycardic or hypoxic.  He is alert and oriented and answers questions appropriately.  Discussed that his physical exam findings and symptom profile appear consistent with sinusitis.  Will treat with doxycycline 100 mg twice daily for 5 days.  We discussed doing the full course over a 7-day course,  However, he states he is going on a trip on Tuesday where he plans on having alcoholic beverages and does not want any interactions.  Recommend take antibiotic with or without food.  Ultimately advised patient that alcohol use while being on antibiotics is contraindicated.  He verbalized understanding of this.    Recommend use of over-the-counter oral antihistamine such as Claritin, Allegra, or Zyrtec.  Recommend use of Flonase nasal spray as  well as Mucinex to help with symptoms.  Orders:    doxycycline (ADOXA) 100 MG tablet; Take 1 tablet by mouth 2 (Two) Times a Day for 5 days.    Mixed hyperlipidemia  Discussed with patient that his cholesterol panel is stable but has also slightly worsened in some areas while slightly improving and others.  Reviewed his lipid panel from 1/17/2025 during visit today.  We discussed the cardiac calcium risk score via CT.  Patient would like to continue lifestyle modifications with diet and exercise for another 3 months and they would like to have his levels rechecked.  He states that if his levels remain high, he would like to pursue further workup.  We discussed that with his numbers on his lipid panel, he is at risk for cardiovascular event such as MI/stroke.  Recommend low-carb, low-fat, high-fiber diet as well as incorporating least 30 minutes of exercise daily in his routine.  Recommend decreasing alcohol use as well as limiting red meat.  Patient is agreeable to plan and verbalized understanding.         Recommend if symptoms worsen or fail to improve he return to clinic for further evaluation.  Recommend that patient can notify his PCP closer to time to see if he can just come in and have a lipid panel checked.  He is agreeable to this and verbalized understanding of plan of care.    Follow Up   Return for Next scheduled follow up.  Patient was given instructions and counseling regarding his condition or for health maintenance advice. Please see specific information pulled into the AVS if appropriate.     JOSE JUAN Benitez

## 2025-03-09 DIAGNOSIS — I10 PRIMARY HYPERTENSION: ICD-10-CM

## 2025-03-10 RX ORDER — AMLODIPINE BESYLATE 10 MG/1
10 TABLET ORAL DAILY
Qty: 90 TABLET | Refills: 2 | Status: SHIPPED | OUTPATIENT
Start: 2025-03-10

## 2025-03-10 RX ORDER — LOSARTAN POTASSIUM 100 MG/1
100 TABLET ORAL DAILY
Qty: 90 TABLET | Refills: 2 | Status: SHIPPED | OUTPATIENT
Start: 2025-03-10

## (undated) DEVICE — GOWN SURG ORBIS LVL3 2XL STRL

## (undated) DEVICE — 1LYRTR 16FR10ML100%SIL UMS SNP: Brand: MEDLINE INDUSTRIES, INC.

## (undated) DEVICE — SEAL

## (undated) DEVICE — BLANKT WARM UPPR/BDY ARM/OUT 57X196CM

## (undated) DEVICE — SOL ANTISTICK CAUTRY ELECTROLUBE LF

## (undated) DEVICE — SNAP KOVER: Brand: UNBRANDED

## (undated) DEVICE — SUT MNCRYL PLS ANTIB UD 4/0 PS2 18IN

## (undated) DEVICE — ARM DRAPE

## (undated) DEVICE — BLADELESS OBTURATOR: Brand: WECK VISTA

## (undated) DEVICE — ENDOPATH PNEUMONEEDLE INSUFFLATION NEEDLES WITH LUER LOCK CONNECTORS 120MM: Brand: ENDOPATH

## (undated) DEVICE — TIP COVER ACCESSORY

## (undated) DEVICE — VIOLET BRAIDED (POLYGLACTIN 910), SYNTHETIC ABSORBABLE SUTURE: Brand: COATED VICRYL

## (undated) DEVICE — COLUMN DRAPE

## (undated) DEVICE — SUT VIC 0 UR6 27IN VCP603H

## (undated) DEVICE — DRAPE,TOP,102X53,STERILE: Brand: MEDLINE

## (undated) DEVICE — LAPAROVUE VISIBILITY SYSTEM LAPAROSCOPIC SOLUTIONS: Brand: LAPAROVUE

## (undated) DEVICE — PATIENT RETURN ELECTRODE, SINGLE-USE, CONTACT QUALITY MONITORING, ADULT, WITH 9FT CORD, FOR PATIENTS WEIGING OVER 33LBS. (15KG): Brand: MEGADYNE

## (undated) DEVICE — Device

## (undated) DEVICE — GLV SURG SENSICARE PI MIC PF SZ8.5 LF STRL

## (undated) DEVICE — PK LAP LASR CHOLE 10

## (undated) DEVICE — GLV SURG SENSICARE PI MIC PF SZ8 LF STRL